# Patient Record
Sex: MALE | Race: WHITE | NOT HISPANIC OR LATINO | Employment: FULL TIME | URBAN - METROPOLITAN AREA
[De-identification: names, ages, dates, MRNs, and addresses within clinical notes are randomized per-mention and may not be internally consistent; named-entity substitution may affect disease eponyms.]

---

## 2021-07-08 RX ORDER — CETIRIZINE HYDROCHLORIDE 10 MG/1
10 TABLET ORAL DAILY PRN
COMMUNITY

## 2021-07-08 RX ORDER — ASPIRIN 81 MG/1
81 TABLET ORAL DAILY
COMMUNITY
End: 2022-04-05 | Stop reason: HOSPADM

## 2021-07-09 ENCOUNTER — ANESTHESIA EVENT (OUTPATIENT)
Dept: GASTROENTEROLOGY | Facility: HOSPITAL | Age: 51
End: 2021-07-09

## 2021-07-09 ENCOUNTER — ANESTHESIA (OUTPATIENT)
Dept: GASTROENTEROLOGY | Facility: HOSPITAL | Age: 51
End: 2021-07-09

## 2021-07-09 ENCOUNTER — HOSPITAL ENCOUNTER (OUTPATIENT)
Facility: HOSPITAL | Age: 51
Setting detail: HOSPITAL OUTPATIENT SURGERY
Discharge: HOME | End: 2021-07-09
Attending: INTERNAL MEDICINE | Admitting: INTERNAL MEDICINE

## 2021-07-09 VITALS
OXYGEN SATURATION: 99 % | TEMPERATURE: 97.2 F | RESPIRATION RATE: 17 BRPM | HEART RATE: 56 BPM | SYSTOLIC BLOOD PRESSURE: 121 MMHG | WEIGHT: 271.17 LBS | DIASTOLIC BLOOD PRESSURE: 62 MMHG | HEIGHT: 73 IN | BODY MASS INDEX: 35.94 KG/M2

## 2021-07-09 PROBLEM — K57.90 DIVERTICULOSIS: Status: ACTIVE | Noted: 2021-07-09

## 2021-07-09 PROBLEM — K64.9 HEMORRHOIDS: Status: ACTIVE | Noted: 2021-07-09

## 2021-07-09 PROCEDURE — 25010000002 PROPOFOL 10 MG/ML EMULSION: Performed by: NURSE ANESTHETIST, CERTIFIED REGISTERED

## 2021-07-09 PROCEDURE — 45378 DIAGNOSTIC COLONOSCOPY: CPT | Performed by: INTERNAL MEDICINE

## 2021-07-09 RX ORDER — LIDOCAINE HYDROCHLORIDE 20 MG/ML
INJECTION, SOLUTION INFILTRATION; PERINEURAL AS NEEDED
Status: DISCONTINUED | OUTPATIENT
Start: 2021-07-09 | End: 2021-07-09 | Stop reason: SURG

## 2021-07-09 RX ORDER — SODIUM CHLORIDE, SODIUM LACTATE, POTASSIUM CHLORIDE, CALCIUM CHLORIDE 600; 310; 30; 20 MG/100ML; MG/100ML; MG/100ML; MG/100ML
30 INJECTION, SOLUTION INTRAVENOUS CONTINUOUS
Status: DISCONTINUED | OUTPATIENT
Start: 2021-07-09 | End: 2021-07-09 | Stop reason: HOSPADM

## 2021-07-09 RX ADMIN — LIDOCAINE HYDROCHLORIDE 40 MG: 20 INJECTION, SOLUTION INFILTRATION; PERINEURAL at 10:58

## 2021-07-09 RX ADMIN — SODIUM CHLORIDE, POTASSIUM CHLORIDE, SODIUM LACTATE AND CALCIUM CHLORIDE 30 ML/HR: 600; 310; 30; 20 INJECTION, SOLUTION INTRAVENOUS at 10:52

## 2021-07-09 RX ADMIN — PROPOFOL 250 MCG/KG/MIN: 10 INJECTION, EMULSION INTRAVENOUS at 10:58

## 2021-07-09 NOTE — H&P
"Pre Procedure History & Physical    Chief Complaint:   Colon cancer screening    Subjective     HPI:   Colon cancer screening average risk    Past Medical History:   Past Medical History:   Diagnosis Date   • DDD (degenerative disc disease), lumbar    • Sleep apnea    • Stomach ulcer    • Stool incontinence    • Urine incontinence        Past Surgical History:  Past Surgical History:   Procedure Laterality Date   • APPENDECTOMY     • CARDIAC CATHETERIZATION     • HAND SURGERY Right        Family History:  Family History   Problem Relation Age of Onset   • Colon cancer Neg Hx    • Malig Hyperthermia Neg Hx        Social History:   reports that he quit smoking about 7 years ago. He quit smokeless tobacco use about 5 years ago.  His smokeless tobacco use included chew. He reports that he does not drink alcohol and does not use drugs.    Medications:   Medications Prior to Admission   Medication Sig Dispense Refill Last Dose   • cetirizine (zyrTEC) 10 MG tablet Take 10 mg by mouth Daily.      • aspirin (aspirin) 81 MG EC tablet Take 81 mg by mouth Daily.          Allergies:  Patient has no known allergies.        Objective     Height 185.4 cm (72.99\"), weight 123 kg (271 lb 2.7 oz).    Physical Exam   Constitutional: Pt is oriented to person, place, and time and well-developed, well-nourished, and in no distress.   Mouth/Throat: Oropharynx is clear and moist.   Neck: Normal range of motion.   Cardiovascular: Normal rate, regular rhythm and normal heart sounds.    Pulmonary/Chest: Effort normal and breath sounds normal.   Abdominal: Soft. Nontender  Skin: Skin is warm and dry.   Psychiatric: Mood, memory, affect and judgment normal.     Assessment/Plan     Diagnosis:  Average of screening    Anticipated Surgical Procedure:  Colonoscopy    The risks, benefits, and alternatives of this procedure have been discussed with the patient or the responsible party- the patient understands and agrees to proceed.            "

## 2021-07-09 NOTE — ANESTHESIA PREPROCEDURE EVALUATION
Anesthesia Evaluation     Patient summary reviewed and Nursing notes reviewed   no history of anesthetic complications:  NPO Solid Status: > 8 hours  NPO Liquid Status: > 2 hours           Airway   Mallampati: II  TM distance: >3 FB  Neck ROM: full  No difficulty expected  Dental      Pulmonary - normal exam    breath sounds clear to auscultation  (+) sleep apnea on CPAP,   Cardiovascular - normal exam  Exercise tolerance: good (4-7 METS)    Rhythm: regular        Neuro/Psych- negative ROS  GI/Hepatic/Renal/Endo    (+) obesity,  PUD,      Musculoskeletal     Abdominal    Substance History - negative use     OB/GYN negative ob/gyn ROS         Other   arthritis,                      Anesthesia Plan    ASA 2     general   total IV anesthesia  intravenous induction     Anesthetic plan, all risks, benefits, and alternatives have been provided, discussed and informed consent has been obtained with: patient.

## 2021-07-09 NOTE — ANESTHESIA POSTPROCEDURE EVALUATION
Patient: Len Giron    Procedure Summary     Date: 07/09/21 Room / Location: [facility] ENDOSCOPY 4 / [facility]    Anesthesia Start: 1058 Anesthesia Stop: 1120    Procedure: COLONOSCOPY (N/A ) Diagnosis: (COLON SCREENING)    Surgeons: Dorian Chung MD Provider: Vic Nguyen MD    Anesthesia Type: general ASA Status: 2          Anesthesia Type: general    Vitals  Vitals Value Taken Time   /62 07/09/21 1142   Temp 36.2 °C (97.2 °F) 07/09/21 1142   Pulse 56 07/09/21 1142   Resp 17 07/09/21 1142   SpO2 99 % 07/09/21 1142           Post Anesthesia Care and Evaluation    Patient location during evaluation: bedside  Patient participation: complete - patient participated  Level of consciousness: awake  Pain score: 0  Pain management: adequate  Airway patency: patent  Anesthetic complications: No anesthetic complications  PONV Status: none  Cardiovascular status: acceptable and stable  Respiratory status: acceptable and room air  Hydration status: acceptable

## 2021-08-06 ENCOUNTER — TELEPHONE (OUTPATIENT)
Dept: GASTROENTEROLOGY | Facility: CLINIC | Age: 51
End: 2021-08-06

## 2022-04-04 ENCOUNTER — APPOINTMENT (OUTPATIENT)
Dept: CT IMAGING | Facility: HOSPITAL | Age: 52
End: 2022-04-04

## 2022-04-04 ENCOUNTER — HOSPITAL ENCOUNTER (OUTPATIENT)
Facility: HOSPITAL | Age: 52
Setting detail: OBSERVATION
Discharge: HOME | End: 2022-04-05
Attending: EMERGENCY MEDICINE | Admitting: INTERNAL MEDICINE

## 2022-04-04 DIAGNOSIS — N28.0 RENAL INFARCTION: ICD-10-CM

## 2022-04-04 DIAGNOSIS — R10.9 ACUTE RIGHT FLANK PAIN: ICD-10-CM

## 2022-04-04 DIAGNOSIS — R10.13 ABDOMINAL PAIN, ACUTE, EPIGASTRIC: Primary | ICD-10-CM

## 2022-04-04 LAB
ALBUMIN SERPL-MCNC: 4.8 G/DL (ref 3.5–5.2)
ALBUMIN/GLOB SERPL: 1.5 G/DL
ALP SERPL-CCNC: 82 U/L (ref 39–117)
ALT SERPL W P-5'-P-CCNC: 32 U/L (ref 1–41)
ANION GAP SERPL CALCULATED.3IONS-SCNC: 11.6 MMOL/L (ref 5–15)
AST SERPL-CCNC: 30 U/L (ref 1–40)
BASOPHILS # BLD AUTO: 0.04 10*3/MM3 (ref 0–0.2)
BASOPHILS NFR BLD AUTO: 0.4 % (ref 0–1.5)
BILIRUB SERPL-MCNC: 0.7 MG/DL (ref 0–1.2)
BILIRUB UR QL STRIP: NEGATIVE
BUN SERPL-MCNC: 15 MG/DL (ref 6–20)
BUN/CREAT SERPL: 13.9 (ref 7–25)
CALCIUM SPEC-SCNC: 9.7 MG/DL (ref 8.6–10.5)
CHLORIDE SERPL-SCNC: 103 MMOL/L (ref 98–107)
CLARITY UR: CLEAR
CO2 SERPL-SCNC: 25.4 MMOL/L (ref 22–29)
COLOR UR: YELLOW
CREAT SERPL-MCNC: 1.08 MG/DL (ref 0.76–1.27)
DEPRECATED RDW RBC AUTO: 39.1 FL (ref 37–54)
EGFRCR SERPLBLD CKD-EPI 2021: 82.6 ML/MIN/1.73
EOSINOPHIL # BLD AUTO: 0.24 10*3/MM3 (ref 0–0.4)
EOSINOPHIL NFR BLD AUTO: 2.7 % (ref 0.3–6.2)
ERYTHROCYTE [DISTWIDTH] IN BLOOD BY AUTOMATED COUNT: 12.8 % (ref 12.3–15.4)
GLOBULIN UR ELPH-MCNC: 3.1 GM/DL
GLUCOSE SERPL-MCNC: 126 MG/DL (ref 65–99)
GLUCOSE UR STRIP-MCNC: NEGATIVE MG/DL
HCT VFR BLD AUTO: 45.7 % (ref 37.5–51)
HGB BLD-MCNC: 15.6 G/DL (ref 13–17.7)
HGB UR QL STRIP.AUTO: NEGATIVE
HOLD SPECIMEN: NORMAL
HOLD SPECIMEN: NORMAL
IMM GRANULOCYTES # BLD AUTO: 0.03 10*3/MM3 (ref 0–0.05)
IMM GRANULOCYTES NFR BLD AUTO: 0.3 % (ref 0–0.5)
INR PPP: 1.09 (ref 2–3)
KETONES UR QL STRIP: ABNORMAL
LEUKOCYTE ESTERASE UR QL STRIP.AUTO: NEGATIVE
LIPASE SERPL-CCNC: 21 U/L (ref 13–60)
LYMPHOCYTES # BLD AUTO: 2 10*3/MM3 (ref 0.7–3.1)
LYMPHOCYTES NFR BLD AUTO: 22.1 % (ref 19.6–45.3)
MCH RBC QN AUTO: 28.8 PG (ref 26.6–33)
MCHC RBC AUTO-ENTMCNC: 34.1 G/DL (ref 31.5–35.7)
MCV RBC AUTO: 84.3 FL (ref 79–97)
MONOCYTES # BLD AUTO: 0.91 10*3/MM3 (ref 0.1–0.9)
MONOCYTES NFR BLD AUTO: 10.1 % (ref 5–12)
NEUTROPHILS NFR BLD AUTO: 5.82 10*3/MM3 (ref 1.7–7)
NEUTROPHILS NFR BLD AUTO: 64.4 % (ref 42.7–76)
NITRITE UR QL STRIP: NEGATIVE
NRBC BLD AUTO-RTO: 0 /100 WBC (ref 0–0.2)
PH UR STRIP.AUTO: 5.5 [PH] (ref 5–8)
PLATELET # BLD AUTO: 260 10*3/MM3 (ref 140–450)
PMV BLD AUTO: 10.2 FL (ref 6–12)
POTASSIUM SERPL-SCNC: 4.1 MMOL/L (ref 3.5–5.2)
PROT SERPL-MCNC: 7.9 G/DL (ref 6–8.5)
PROT UR QL STRIP: NEGATIVE
PROTHROMBIN TIME: 11.2 SECONDS (ref 9.4–12)
RBC # BLD AUTO: 5.42 10*6/MM3 (ref 4.14–5.8)
SODIUM SERPL-SCNC: 140 MMOL/L (ref 136–145)
SP GR UR STRIP: 1.02 (ref 1–1.03)
UROBILINOGEN UR QL STRIP: ABNORMAL
WBC NRBC COR # BLD: 9.04 10*3/MM3 (ref 3.4–10.8)
WHOLE BLOOD HOLD SPECIMEN: NORMAL
WHOLE BLOOD HOLD SPECIMEN: NORMAL

## 2022-04-04 PROCEDURE — 80053 COMPREHEN METABOLIC PANEL: CPT | Performed by: EMERGENCY MEDICINE

## 2022-04-04 PROCEDURE — 36415 COLL VENOUS BLD VENIPUNCTURE: CPT | Performed by: EMERGENCY MEDICINE

## 2022-04-04 PROCEDURE — 81003 URINALYSIS AUTO W/O SCOPE: CPT | Performed by: EMERGENCY MEDICINE

## 2022-04-04 PROCEDURE — 96375 TX/PRO/DX INJ NEW DRUG ADDON: CPT

## 2022-04-04 PROCEDURE — 96376 TX/PRO/DX INJ SAME DRUG ADON: CPT

## 2022-04-04 PROCEDURE — G0378 HOSPITAL OBSERVATION PER HR: HCPCS

## 2022-04-04 PROCEDURE — 85025 COMPLETE CBC W/AUTO DIFF WBC: CPT | Performed by: EMERGENCY MEDICINE

## 2022-04-04 PROCEDURE — 74177 CT ABD & PELVIS W/CONTRAST: CPT

## 2022-04-04 PROCEDURE — 25010000002 HYDROMORPHONE 1 MG/ML SOLUTION: Performed by: EMERGENCY MEDICINE

## 2022-04-04 PROCEDURE — 0 IOPAMIDOL PER 1 ML: Performed by: EMERGENCY MEDICINE

## 2022-04-04 PROCEDURE — 96374 THER/PROPH/DIAG INJ IV PUSH: CPT

## 2022-04-04 PROCEDURE — 83690 ASSAY OF LIPASE: CPT | Performed by: EMERGENCY MEDICINE

## 2022-04-04 PROCEDURE — 99284 EMERGENCY DEPT VISIT MOD MDM: CPT

## 2022-04-04 PROCEDURE — 99220 PR INITIAL OBSERVATION CARE/DAY 70 MINUTES: CPT | Performed by: INTERNAL MEDICINE

## 2022-04-04 PROCEDURE — 25010000002 ONDANSETRON PER 1 MG: Performed by: EMERGENCY MEDICINE

## 2022-04-04 PROCEDURE — 85610 PROTHROMBIN TIME: CPT | Performed by: INTERNAL MEDICINE

## 2022-04-04 RX ORDER — SODIUM CHLORIDE 0.9 % (FLUSH) 0.9 %
10 SYRINGE (ML) INJECTION AS NEEDED
Status: DISCONTINUED | OUTPATIENT
Start: 2022-04-04 | End: 2022-04-05 | Stop reason: HOSPADM

## 2022-04-04 RX ORDER — ONDANSETRON 2 MG/ML
4 INJECTION INTRAMUSCULAR; INTRAVENOUS ONCE
Status: COMPLETED | OUTPATIENT
Start: 2022-04-04 | End: 2022-04-04

## 2022-04-04 RX ORDER — ACETAMINOPHEN 325 MG/1
650 TABLET ORAL EVERY 4 HOURS PRN
Status: DISCONTINUED | OUTPATIENT
Start: 2022-04-04 | End: 2022-04-05 | Stop reason: HOSPADM

## 2022-04-04 RX ORDER — HYDROCODONE BITARTRATE AND ACETAMINOPHEN 5; 325 MG/1; MG/1
1 TABLET ORAL EVERY 6 HOURS PRN
Status: DISCONTINUED | OUTPATIENT
Start: 2022-04-04 | End: 2022-04-05 | Stop reason: HOSPADM

## 2022-04-04 RX ORDER — ONDANSETRON 2 MG/ML
4 INJECTION INTRAMUSCULAR; INTRAVENOUS EVERY 6 HOURS PRN
Status: DISCONTINUED | OUTPATIENT
Start: 2022-04-04 | End: 2022-04-05 | Stop reason: HOSPADM

## 2022-04-04 RX ORDER — SODIUM CHLORIDE, SODIUM LACTATE, POTASSIUM CHLORIDE, CALCIUM CHLORIDE 600; 310; 30; 20 MG/100ML; MG/100ML; MG/100ML; MG/100ML
100 INJECTION, SOLUTION INTRAVENOUS CONTINUOUS
Status: DISCONTINUED | OUTPATIENT
Start: 2022-04-04 | End: 2022-04-05

## 2022-04-04 RX ORDER — HYDROCODONE BITARTRATE AND ACETAMINOPHEN 10; 325 MG/1; MG/1
1 TABLET ORAL EVERY 6 HOURS PRN
Status: DISCONTINUED | OUTPATIENT
Start: 2022-04-04 | End: 2022-04-05 | Stop reason: HOSPADM

## 2022-04-04 RX ADMIN — SODIUM CHLORIDE 1000 ML: 9 INJECTION, SOLUTION INTRAVENOUS at 17:51

## 2022-04-04 RX ADMIN — APIXABAN 10 MG: 5 TABLET, FILM COATED ORAL at 21:57

## 2022-04-04 RX ADMIN — IOPAMIDOL 100 ML: 755 INJECTION, SOLUTION INTRAVENOUS at 18:16

## 2022-04-04 RX ADMIN — ONDANSETRON 4 MG: 2 INJECTION INTRAMUSCULAR; INTRAVENOUS at 17:51

## 2022-04-04 RX ADMIN — HYDROMORPHONE HYDROCHLORIDE 1 MG: 1 INJECTION, SOLUTION INTRAMUSCULAR; INTRAVENOUS; SUBCUTANEOUS at 17:51

## 2022-04-04 RX ADMIN — HYDROMORPHONE HYDROCHLORIDE 1 MG: 1 INJECTION, SOLUTION INTRAMUSCULAR; INTRAVENOUS; SUBCUTANEOUS at 20:13

## 2022-04-04 NOTE — ED PROVIDER NOTES
Time: 4:34 PM EDT  Arrived by: HAILEE  Chief Complaint: Abdominal pain  History provided by: Patient  History is limited by: N/A     History of Present Illness:  Patient is a 51 y.o. male that presents to the emergency department with abdominal pain over the past 3 days. The pain is located mainly in the upper abdomen, radiates into his lower back, and is described as sharp and severe. He states that the pain worsens whenever he takes deep breaths..    Endorses one episode of diarrhea without any N/V. No urinary complaints. No black/bloody stool. Reports hx of appendectomy and stomach ulcer. No hx of cholecystectomy or pancreatitis.     No fevers or chills, but states that he did feel hot and diaphoretic. Endorses generalized body aches and mild cough.     Denies any medicinal allergies.       History provided by:  Patient   used: No        Similar Symptoms Previously: Hx stomach ulcers, appendectomy  Recently seen: N/a      Patient Care Team  Primary Care Provider: Provider, No Known    Past Medical History:     No Known Allergies  Past Medical History:   Diagnosis Date   • DDD (degenerative disc disease), lumbar    • Sleep apnea    • Stomach ulcer    • Stool incontinence    • Urine incontinence      Past Surgical History:   Procedure Laterality Date   • APPENDECTOMY     • CARDIAC CATHETERIZATION     • COLONOSCOPY N/A 7/9/2021    Procedure: COLONOSCOPY;  Surgeon: Dorian Chung MD;  Location: [facility] ENDOSCOPY;  Service: Gastroenterology;  Laterality: N/A;  diverticulosis/HEMORRHOIDS   • HAND SURGERY Right      Family History   Problem Relation Age of Onset   • Colon cancer Neg Hx    • Malig Hyperthermia Neg Hx        Home Medications:  Prior to Admission medications    Medication Sig Start Date End Date Taking? Authorizing Provider   aspirin (aspirin) 81 MG EC tablet Take 81 mg by mouth Daily.    Provider, MD Caleb   cetirizine (zyrTEC) 10 MG tablet Take 10 mg by mouth Daily.     "Provider, Caleb, MD        Social History:   Social History     Tobacco Use   • Smoking status: Former Smoker     Quit date: 2014     Years since quittin.7   • Smokeless tobacco: Former User     Types: Chew     Quit date: 2016   Substance Use Topics   • Alcohol use: Never   • Drug use: Never     Recent travel: no     Review of Systems:  Review of Systems   Constitutional: Positive for diaphoresis. Negative for chills and fever.        Generalized body aches   HENT: Negative for congestion, nosebleeds and rhinorrhea.    Eyes: Negative for visual disturbance.   Respiratory: Positive for cough (Mild). Negative for shortness of breath.    Cardiovascular: Negative for chest pain and leg swelling.   Gastrointestinal: Positive for abdominal pain and diarrhea. Negative for blood in stool, nausea and vomiting.   Genitourinary: Negative for dysuria and hematuria.   Musculoskeletal: Positive for back pain.   Skin: Negative for pallor and rash.   Neurological: Negative for weakness, numbness and headaches.   All other systems reviewed and are negative.       Physical Exam:  /86 (BP Location: Left arm, Patient Position: Sitting)   Pulse 72   Temp 98.5 °F (36.9 °C) (Oral)   Resp 18   Ht 185.4 cm (73\")   Wt 124 kg (273 lb 9.5 oz)   SpO2 97%   BMI 36.10 kg/m²     Physical Exam  Vitals and nursing note reviewed.   Constitutional:       Appearance: Normal appearance.      Comments: Moderately uncomfortable secondary to abdominal pain   HENT:      Head: Normocephalic and atraumatic.      Mouth/Throat:      Mouth: Mucous membranes are moist.   Eyes:      General: No scleral icterus.     Extraocular Movements: Extraocular movements intact.      Pupils: Pupils are equal, round, and reactive to light.   Cardiovascular:      Rate and Rhythm: Normal rate and regular rhythm.      Pulses: Normal pulses.      Heart sounds: Normal heart sounds. No murmur heard.  Pulmonary:      Effort: Pulmonary effort is normal. " No respiratory distress.      Breath sounds: Normal breath sounds. No wheezing.   Abdominal:      General: There is no distension.      Palpations: Abdomen is soft.      Tenderness: There is generalized abdominal tenderness (Severe). There is guarding. There is no rebound.   Musculoskeletal:         General: Normal range of motion.      Cervical back: Neck supple.      Right lower leg: No edema.      Left lower leg: No edema.   Skin:     Coloration: Skin is not pale.   Neurological:      General: No focal deficit present.      Mental Status: He is alert and oriented to person, place, and time.      Cranial Nerves: No cranial nerve deficit.      Sensory: No sensory deficit.        Mod uncomfy second to pain, severely diffuse tendy abd gaurding          Medications in the Emergency Department:  Medications   sodium chloride 0.9 % flush 10 mL (has no administration in time range)   sodium chloride 0.9 % flush 10 mL (has no administration in time range)   HYDROmorphone (DILAUDID) injection 0.5 mg (has no administration in time range)   HYDROcodone-acetaminophen (NORCO) 5-325 MG per tablet 1 tablet (has no administration in time range)   HYDROcodone-acetaminophen (NORCO)  MG per tablet 1 tablet (has no administration in time range)   ondansetron (ZOFRAN) injection 4 mg (has no administration in time range)   apixaban (ELIQUIS) tablet 10 mg (has no administration in time range)   lactated ringers infusion (has no administration in time range)   sodium chloride 0.9 % bolus 1,000 mL (0 mL Intravenous Stopped 4/4/22 1947)   HYDROmorphone (DILAUDID) injection 1 mg (1 mg Intravenous Given 4/4/22 1751)   ondansetron (ZOFRAN) injection 4 mg (4 mg Intravenous Given 4/4/22 1751)   iopamidol (ISOVUE-370) 76 % injection 100 mL (100 mL Intravenous Given 4/4/22 1816)   HYDROmorphone (DILAUDID) injection 1 mg (1 mg Intravenous Given 4/4/22 2013)   apixaban (ELIQUIS) tablet 10 mg (10 mg Oral Given 4/4/22 2157)        Labs  Lab  Results (last 24 hours)     Procedure Component Value Units Date/Time    CBC & Differential [[id number]]  (Abnormal) Collected: 04/04/22 1617    Specimen: Blood Updated: 04/04/22 1652    Narrative:      The following orders were created for panel order CBC & Differential.  Procedure                               Abnormality         Status                     ---------                               -----------         ------                     CBC Auto Differential[[id number]]        Abnormal            Final result                 Please view results for these tests on the individual orders.    Comprehensive Metabolic Panel [[id number]]  (Abnormal) Collected: 04/04/22 1617    Specimen: Blood Updated: 04/04/22 1720     Glucose 126 mg/dL      BUN 15 mg/dL      Creatinine 1.08 mg/dL      Sodium 140 mmol/L      Potassium 4.1 mmol/L      Chloride 103 mmol/L      CO2 25.4 mmol/L      Calcium 9.7 mg/dL      Total Protein 7.9 g/dL      Albumin 4.80 g/dL      ALT (SGPT) 32 U/L      AST (SGOT) 30 U/L      Alkaline Phosphatase 82 U/L      Total Bilirubin 0.7 mg/dL      Globulin 3.1 gm/dL      A/G Ratio 1.5 g/dL      BUN/Creatinine Ratio 13.9     Anion Gap 11.6 mmol/L      eGFR 82.6 mL/min/1.73      Comment: National Kidney Foundation and American Society of Nephrology (ASN) Task Force recommended calculation based on the Chronic Kidney Disease Epidemiology Collaboration (CKD-EPI) equation refit without adjustment for race.       Narrative:      GFR Normal >60  Chronic Kidney Disease <60  Kidney Failure <15      Lipase [[id number]]  (Normal) Collected: 04/04/22 1617    Specimen: Blood Updated: 04/04/22 1720     Lipase 21 U/L     CBC Auto Differential [[id number]]  (Abnormal) Collected: 04/04/22 1617    Specimen: Blood Updated: 04/04/22 1652     WBC 9.04 10*3/mm3      RBC 5.42 10*6/mm3      Hemoglobin 15.6 g/dL      Hematocrit 45.7 %      MCV 84.3 fL      MCH 28.8 pg      MCHC 34.1 g/dL      RDW 12.8 %      RDW-SD 39.1 fl      MPV  10.2 fL      Platelets 260 10*3/mm3      Neutrophil % 64.4 %      Lymphocyte % 22.1 %      Monocyte % 10.1 %      Eosinophil % 2.7 %      Basophil % 0.4 %      Immature Grans % 0.3 %      Neutrophils, Absolute 5.82 10*3/mm3      Lymphocytes, Absolute 2.00 10*3/mm3      Monocytes, Absolute 0.91 10*3/mm3      Eosinophils, Absolute 0.24 10*3/mm3      Basophils, Absolute 0.04 10*3/mm3      Immature Grans, Absolute 0.03 10*3/mm3      nRBC 0.0 /100 WBC     Urinalysis With Microscopic If Indicated (No Culture) - Urine, Clean Catch [[id number]]  (Abnormal) Collected: 04/04/22 1742    Specimen: Urine, Clean Catch Updated: 04/04/22 1800     Color, UA Yellow     Appearance, UA Clear     pH, UA 5.5     Specific Gravity, UA 1.023     Glucose, UA Negative     Ketones, UA Trace     Bilirubin, UA Negative     Blood, UA Negative     Protein, UA Negative     Leuk Esterase, UA Negative     Nitrite, UA Negative     Urobilinogen, UA 0.2 E.U./dL    Narrative:      Urine microscopic not indicated.           Imaging:  CT Abdomen Pelvis With Contrast    Result Date: 4/4/2022  PROCEDURE: CT ABDOMEN PELVIS W CONTRAST  COMPARISON: None  INDICATIONS: Epigastric pain, eval perfed peptic ulcer vs gallstones  TECHNIQUE: After obtaining the patient's consent, CT images were created with non-ionic intravenous contrast material.   PROTOCOL:   Standard imaging protocol performed    RADIATION:   DLP: 1756.7mGy*cm   Automated exposure control was utilized to minimize radiation dose. CONTRAST: 93cc Isovue 370 I.V. LABS:   eGFR: >60ml/min/1.73m2  FINDINGS:  There is dependent atelectasis in the lung bases bilaterally.  The liver, gallbladder, spleen, pancreas and adrenal glands appear unremarkable.  In the superior pole of the right kidney is a low-density focus measuring 3.0 cm x 2.4 cm.  This measures 20 Hounsfield units.  Perinephric stranding is noted adjacent to the lesion.  The left kidney appears unremarkable.  No adenopathy or free fluid is seen in  the abdomen or pelvis.  Mild colonic diverticulosis is noted.  An appendectomy has been performed.  No acute bowel abnormality is seen.  The lack of oral contrast limits evaluation of the bowel.  No focal osseous lesion is seen.        1. 3.0 cm x 2.4 cm low density focus in the superior right kidney may represent an underlying cyst.  The presence of adjacent infiltrative change raises the possibility of superimposed infection or possibly a small renal infarct. 2. Previous appendectomy     Joey Huizar M.D.       Electronically Signed and Approved By: Joey Huizar M.D. on 4/04/2022 at 18:51               Procedures:  Procedures    Progress                            Medical Decision Making:  MDM     My differential diagnosis in this patient with flank pain includes but not limited to: Renal colic from obstructing stone, pyelonephritis, musculoskeletal back pain, atypical presentation of diverticulitis or colitis.    In my differential diagnosis of this patient with abdominal pain, I considered viral gastroenteritis, acute gastritis, GERD exacerbation with esophagitis, peptic ulcer disease, pancreatitis, cholecystitis, appendicitis.          This patient is a 51-year-old male presenting with acute diffuse upper abdominal pain and also flank pain for the past few days, getting worse.    His vital signs are within normal limits and no fever noted.    All of his blood work including white blood cell count is normal and normal renal function and normal LFTs and lipase.    Urinalysis negative for blood or infection.    Given his severe pain I give him some IV Dilaudid and Zofran for nausea and some IV fluids here.    I did send him for a CT scan of the abdomen pelvis with IV contrast that came back showing unremarkable intra-abdominal organs but a 3 cm area of hypodensity in the superior right kidney with some surrounding infiltrative change or stranding.    The differential includes likely renal infarct versus possible  infection, but he has no urinary symptoms and completely negative urinalysis and normal white count and no fever or sign of infection here.    I considered most likely this is a renal infarct causing his flank and abdominal pain and have given him a dose of Eliquis after consulting with our vascular surgeon on, who recommends just anticoagulation and further work-up for other thromboembolism or hypercoagulable state, but denies any need for emergent revascularization at this time.    I have also discussed in consult with nephrology on-call who recommends similar including looking for underlying atrial fibrillation and further work-up for hypercoagulable state and anticoagulant initiation.        Because [facility] I will run this admission by them to see if they prefer him to be transferred to [facility] at this time.      The [facility] physician, Dr. Jj Jamil, has informed me that they are on diver due to lack of available beds, and recommends I go ahead and admit him to our facility here, and he will make a note in the patient's chart for [facility] insurance to cover his hospital bills here.      Otherwise I will plan to admit to our hospital for his intractable abdominal and flank pain in the setting of likely right renal infarct and for further investigation with CTA of chest and abdomen and pelvis as per vascular surgery recommendations.    Final diagnoses:   Abdominal pain, acute, epigastric   Renal infarction (HCC)   Acute right flank pain        Disposition:  ED Disposition     ED Disposition   Decision to Admit    Condition   --    Comment   Level of Care: Telemetry [5]   Diagnosis: Abdominal pain, acute, epigastric [462372]   Admitting Physician: KUMAR AMARO [[id number]]   Attending Physician: KUMAR AMARO [[id number]]               Documentation assistance provided by Sid Zimmerman acting as scribe for Archie Porter MD  . Information recorded by the scribe  was done at my direction and has been verified and validated by me.        Sid Zimmerman  04/04/22 1723       Archie Porter MD  04/04/22 1920

## 2022-04-05 VITALS
HEIGHT: 73 IN | OXYGEN SATURATION: 95 % | TEMPERATURE: 98.4 F | WEIGHT: 272.49 LBS | HEART RATE: 94 BPM | BODY MASS INDEX: 36.11 KG/M2 | RESPIRATION RATE: 18 BRPM | DIASTOLIC BLOOD PRESSURE: 94 MMHG | SYSTOLIC BLOOD PRESSURE: 140 MMHG

## 2022-04-05 LAB
ANION GAP SERPL CALCULATED.3IONS-SCNC: 10.8 MMOL/L (ref 5–15)
BASOPHILS # BLD AUTO: 0.05 10*3/MM3 (ref 0–0.2)
BASOPHILS NFR BLD AUTO: 0.5 % (ref 0–1.5)
BUN SERPL-MCNC: 13 MG/DL (ref 6–20)
BUN/CREAT SERPL: 13 (ref 7–25)
CALCIUM SPEC-SCNC: 9 MG/DL (ref 8.6–10.5)
CHLORIDE SERPL-SCNC: 103 MMOL/L (ref 98–107)
CO2 SERPL-SCNC: 22.2 MMOL/L (ref 22–29)
CREAT SERPL-MCNC: 1 MG/DL (ref 0.76–1.27)
DEPRECATED RDW RBC AUTO: 39.8 FL (ref 37–54)
EGFRCR SERPLBLD CKD-EPI 2021: 90.6 ML/MIN/1.73
EOSINOPHIL # BLD AUTO: 0.22 10*3/MM3 (ref 0–0.4)
EOSINOPHIL NFR BLD AUTO: 2.1 % (ref 0.3–6.2)
ERYTHROCYTE [DISTWIDTH] IN BLOOD BY AUTOMATED COUNT: 12.9 % (ref 12.3–15.4)
GLUCOSE SERPL-MCNC: 96 MG/DL (ref 65–99)
HCT VFR BLD AUTO: 43.7 % (ref 37.5–51)
HGB BLD-MCNC: 14.6 G/DL (ref 13–17.7)
IMM GRANULOCYTES # BLD AUTO: 0.02 10*3/MM3 (ref 0–0.05)
IMM GRANULOCYTES NFR BLD AUTO: 0.2 % (ref 0–0.5)
LYMPHOCYTES # BLD AUTO: 2.33 10*3/MM3 (ref 0.7–3.1)
LYMPHOCYTES NFR BLD AUTO: 22.1 % (ref 19.6–45.3)
MCH RBC QN AUTO: 28.2 PG (ref 26.6–33)
MCHC RBC AUTO-ENTMCNC: 33.4 G/DL (ref 31.5–35.7)
MCV RBC AUTO: 84.5 FL (ref 79–97)
MONOCYTES # BLD AUTO: 1.69 10*3/MM3 (ref 0.1–0.9)
MONOCYTES NFR BLD AUTO: 16.1 % (ref 5–12)
NEUTROPHILS NFR BLD AUTO: 59 % (ref 42.7–76)
NEUTROPHILS NFR BLD AUTO: 6.21 10*3/MM3 (ref 1.7–7)
NRBC BLD AUTO-RTO: 0 /100 WBC (ref 0–0.2)
PLATELET # BLD AUTO: 238 10*3/MM3 (ref 140–450)
PMV BLD AUTO: 10.2 FL (ref 6–12)
POTASSIUM SERPL-SCNC: 4 MMOL/L (ref 3.5–5.2)
RBC # BLD AUTO: 5.17 10*6/MM3 (ref 4.14–5.8)
SODIUM SERPL-SCNC: 136 MMOL/L (ref 136–145)
WBC NRBC COR # BLD: 10.52 10*3/MM3 (ref 3.4–10.8)

## 2022-04-05 PROCEDURE — 81241 F5 GENE: CPT | Performed by: INTERNAL MEDICINE

## 2022-04-05 PROCEDURE — 94799 UNLISTED PULMONARY SVC/PX: CPT

## 2022-04-05 PROCEDURE — 83520 IMMUNOASSAY QUANT NOS NONAB: CPT | Performed by: INTERNAL MEDICINE

## 2022-04-05 PROCEDURE — 85025 COMPLETE CBC W/AUTO DIFF WBC: CPT | Performed by: INTERNAL MEDICINE

## 2022-04-05 PROCEDURE — 94660 CPAP INITIATION&MGMT: CPT

## 2022-04-05 PROCEDURE — 96376 TX/PRO/DX INJ SAME DRUG ADON: CPT

## 2022-04-05 PROCEDURE — 80048 BASIC METABOLIC PNL TOTAL CA: CPT | Performed by: INTERNAL MEDICINE

## 2022-04-05 PROCEDURE — 25010000002 HYDROMORPHONE 1 MG/ML SOLUTION: Performed by: INTERNAL MEDICINE

## 2022-04-05 PROCEDURE — 96361 HYDRATE IV INFUSION ADD-ON: CPT

## 2022-04-05 PROCEDURE — G0378 HOSPITAL OBSERVATION PER HR: HCPCS

## 2022-04-05 PROCEDURE — 99217 PR OBSERVATION CARE DISCHARGE MANAGEMENT: CPT | Performed by: INTERNAL MEDICINE

## 2022-04-05 PROCEDURE — 94761 N-INVAS EAR/PLS OXIMETRY MLT: CPT

## 2022-04-05 RX ORDER — PERPHENAZINE/AMITRIPTYLINE HCL 4 MG-50 MG
1 TABLET ORAL DAILY
COMMUNITY
End: 2023-04-04

## 2022-04-05 RX ORDER — HYDROCODONE BITARTRATE AND ACETAMINOPHEN 10; 325 MG/1; MG/1
1 TABLET ORAL EVERY 6 HOURS PRN
Qty: 15 TABLET | Refills: 0 | Status: SHIPPED | OUTPATIENT
Start: 2022-04-05 | End: 2022-04-05 | Stop reason: SDUPTHER

## 2022-04-05 RX ORDER — PRASTERONE (DHEA) 50 MG
1 CAPSULE ORAL DAILY
COMMUNITY
End: 2023-04-04

## 2022-04-05 RX ORDER — AMPICILLIN TRIHYDRATE 250 MG
500 CAPSULE ORAL DAILY
COMMUNITY
End: 2023-04-04

## 2022-04-05 RX ORDER — HYDROCODONE BITARTRATE AND ACETAMINOPHEN 10; 325 MG/1; MG/1
1 TABLET ORAL EVERY 6 HOURS PRN
Qty: 15 TABLET | Refills: 0 | Status: SHIPPED | OUTPATIENT
Start: 2022-04-05 | End: 2022-04-11

## 2022-04-05 RX ORDER — CHLORAL HYDRATE 500 MG
1000 CAPSULE ORAL
COMMUNITY
End: 2023-04-04

## 2022-04-05 RX ORDER — VIT C/B6/B5/MAGNESIUM/HERB 173 50-5-6-5MG
500 CAPSULE ORAL DAILY
COMMUNITY
End: 2023-04-04

## 2022-04-05 RX ADMIN — APIXABAN 10 MG: 5 TABLET, FILM COATED ORAL at 08:41

## 2022-04-05 RX ADMIN — SODIUM CHLORIDE, POTASSIUM CHLORIDE, SODIUM LACTATE AND CALCIUM CHLORIDE 100 ML/HR: 600; 310; 30; 20 INJECTION, SOLUTION INTRAVENOUS at 00:44

## 2022-04-05 RX ADMIN — HYDROCODONE BITARTRATE AND ACETAMINOPHEN 1 TABLET: 10; 325 TABLET ORAL at 00:09

## 2022-04-05 RX ADMIN — HYDROCODONE BITARTRATE AND ACETAMINOPHEN 1 TABLET: 10; 325 TABLET ORAL at 07:33

## 2022-04-05 RX ADMIN — HYDROMORPHONE HYDROCHLORIDE 0.5 MG: 1 INJECTION, SOLUTION INTRAMUSCULAR; INTRAVENOUS; SUBCUTANEOUS at 04:56

## 2022-04-05 NOTE — CONSULTS
Patient Care Team:  Provider, No Known as PCP - General    Chief complaint: Renal Infarct    Subjective     History of Present Illness  50yo presented to ER with worsened abdominal and right flank pain over last 1 week.  CT abd in ER showed low density focus with possible small renal infarct vs. Infection.  He has had normal renal function to this point with creatinine at 1.0.    Review of Systems   Constitutional: Negative for chills, diaphoresis and fever.   Respiratory: Negative for cough, chest tightness and shortness of breath.    Cardiovascular: Negative for chest pain and leg swelling.   Gastrointestinal: Positive for abdominal pain. Negative for abdominal distention, constipation, diarrhea and nausea.   Genitourinary: Positive for flank pain. Negative for dysuria.   Musculoskeletal: Positive for back pain. Negative for joint swelling.   Skin: Negative for rash.        Past Medical History:   Diagnosis Date   • DDD (degenerative disc disease), lumbar    • Sleep apnea    • Stomach ulcer    • Stool incontinence    • Urine incontinence    ,   Past Surgical History:   Procedure Laterality Date   • APPENDECTOMY     • CARDIAC CATHETERIZATION     • COLONOSCOPY N/A 2021    Procedure: COLONOSCOPY;  Surgeon: Dorian Chung MD;  Location: [facility] ENDOSCOPY;  Service: Gastroenterology;  Laterality: N/A;  diverticulosis/HEMORRHOIDS   • HAND SURGERY Right    ,   Family History   Problem Relation Age of Onset   • Colon cancer Neg Hx    • Malig Hyperthermia Neg Hx    ,   Social History     Socioeconomic History   • Marital status:    Tobacco Use   • Smoking status: Former Smoker     Quit date: 2014     Years since quittin.7   • Smokeless tobacco: Former User     Types: Chew     Quit date: 2016   Substance and Sexual Activity   • Alcohol use: Never   • Drug use: Never     E-cigarette/Vaping     E-cigarette/Vaping Substances     E-cigarette/Vaping Devices       ,   Medications Prior to Admission    Medication Sig Dispense Refill Last Dose   • aspirin 81 MG EC tablet Take 81 mg by mouth Daily.   Past Week at Unknown time   • Barberry-Oreg Grape-Goldenseal (BERBERINE COMPLEX PO) Take 1 capsule by mouth Daily.   4/4/2022 at Unknown time   • cetirizine (zyrTEC) 10 MG tablet Take 10 mg by mouth Daily As Needed for Allergies or Rhinitis.   Past Week at Unknown time   • Cinnamon 500 MG capsule Take 500 mg by mouth Daily.   4/4/2022 at Unknown time   • Ginger 500 MG capsule Take 1 capsule by mouth Daily.   4/4/2022 at Unknown time   • Moringa 500 MG capsule Take 1 capsule by mouth Daily.   4/4/2022 at Unknown time   • Omega-3 Fatty Acids (fish oil) 1000 MG capsule capsule Take 1,000 mg by mouth Daily With Breakfast.   4/4/2022 at Unknown time   • Turmeric 500 MG capsule Take 500 mg by mouth Daily.   4/4/2022 at Unknown time   , Scheduled Meds:  apixaban, 10 mg, Oral, Q12H    , Continuous Infusions:  lactated ringers, 100 mL/hr, Last Rate: 100 mL/hr (04/05/22 0044)    , PRN Meds:  •  acetaminophen  •  HYDROcodone-acetaminophen  •  HYDROcodone-acetaminophen  •  HYDROmorphone  •  ondansetron  •  sodium chloride  •  [COMPLETED] Insert peripheral IV **AND** sodium chloride  •  sodium chloride and Allergies:  Patient has no known allergies.    Objective     Vital Signs  Temp:  [97.9 °F (36.6 °C)-99.3 °F (37.4 °C)] 99.3 °F (37.4 °C)  Heart Rate:  [65-85] 85  Resp:  [18-20] 18  BP: (133-141)/(86-89) 133/89    No intake/output data recorded.  I/O last 3 completed shifts:  In: 1000 [IV Piggyback:1000]  Out: -     Physical Exam  Eyes:      General: No scleral icterus.     Pupils: Pupils are equal, round, and reactive to light.   Cardiovascular:      Rate and Rhythm: Normal rate and regular rhythm.      Pulses: Normal pulses.      Heart sounds: Normal heart sounds.   Pulmonary:      Effort: Pulmonary effort is normal.      Breath sounds: Normal breath sounds.   Abdominal:      General: Abdomen is flat.   Musculoskeletal:          General: Normal range of motion.      Cervical back: Normal range of motion.   Skin:     General: Skin is warm and dry.   Neurological:      General: No focal deficit present.      Mental Status: He is alert.   Psychiatric:         Mood and Affect: Mood normal.         Results Review:    I reviewed the patient's new clinical results.    WBC WBC   Date Value Ref Range Status   04/05/2022 10.52 3.40 - 10.80 10*3/mm3 Final   04/04/2022 9.04 3.40 - 10.80 10*3/mm3 Final      HGB Hemoglobin   Date Value Ref Range Status   04/05/2022 14.6 13.0 - 17.7 g/dL Final   04/04/2022 15.6 13.0 - 17.7 g/dL Final      HCT Hematocrit   Date Value Ref Range Status   04/05/2022 43.7 37.5 - 51.0 % Final   04/04/2022 45.7 37.5 - 51.0 % Final      Platlets No results found for: LABPLAT   MCV MCV   Date Value Ref Range Status   04/05/2022 84.5 79.0 - 97.0 fL Final   04/04/2022 84.3 79.0 - 97.0 fL Final          Sodium Sodium   Date Value Ref Range Status   04/05/2022 136 136 - 145 mmol/L Final   04/04/2022 140 136 - 145 mmol/L Final      Potassium Potassium   Date Value Ref Range Status   04/05/2022 4.0 3.5 - 5.2 mmol/L Final   04/04/2022 4.1 3.5 - 5.2 mmol/L Final      Chloride Chloride   Date Value Ref Range Status   04/05/2022 103 98 - 107 mmol/L Final   04/04/2022 103 98 - 107 mmol/L Final      CO2 CO2   Date Value Ref Range Status   04/05/2022 22.2 22.0 - 29.0 mmol/L Final   04/04/2022 25.4 22.0 - 29.0 mmol/L Final      BUN BUN   Date Value Ref Range Status   04/05/2022 13 6 - 20 mg/dL Final   04/04/2022 15 6 - 20 mg/dL Final      Creatinine Creatinine   Date Value Ref Range Status   04/05/2022 1.00 0.76 - 1.27 mg/dL Final   04/04/2022 1.08 0.76 - 1.27 mg/dL Final      Calcium Calcium   Date Value Ref Range Status   04/05/2022 9.0 8.6 - 10.5 mg/dL Final   04/04/2022 9.7 8.6 - 10.5 mg/dL Final      PO4 No results found for: CAPO4   Albumin Albumin   Date Value Ref Range Status   04/04/2022 4.80 3.50 - 5.20 g/dL Final      Magnesium No  results found for: MG   Uric Acid No results found for: URICACID         Assessment/Plan       Abdominal pain, acute, epigastric      Assessment & Plan  Flank pain-  Possible renal infarct.  Called and d/w radiologist, not definitive for renal infarct on scan.  May be cyst but had some inflammatory changes in area consistent with infarct.  Normal renal function to this point.  UA benign without blood or protein.  Hypercoag w/u sent out per primary.  Volume and electrolytes stable.  No changes at this time.  Would be ok for d/c from renal standpoint with stable labs.    I discussed the patients findings and my recommendations with patient    Grey Rivera MD  04/05/22  10:05 EDT

## 2022-04-05 NOTE — H&P
[facility]    Date of admission: 4/4/2022  Patient Name: Len Giron   1970  Primary Care Physician:  Provider, No Known    Subjective        Chief Complaint   Patient presents with   • Abdominal Pain     Generalized   • Back Pain       HPI:  Len Giron is a 51 y.o. male who presents with progressively worsening abdominal and right flank pain over the past ~1 week.  Patient has a pmhx notable for SARITA on CPAP and morbid obesity.      Patient states last week he started having abdominal pain that was located 'all over', sharp, ~7/10 pain, but noted no focal area of pain that was worse than others, seemed to be worse with deep breaths at times but otherwise had no agrevating or alleviating factors (no change with food, bowel movement, position, activity).  He notes symptoms became progressively worse especially this past Saturday, which eventually prompted him to seek further evaluation as it was not improving.  He denies any associated nausea, vomiting, diarrhea or constipation.  He has been fasting for passover he notes.  He denies any recent changes in medications.  He has a baby aspirin listed for home medication but he notes he only takes this prn for joint pain.      He has not had any fevers or chills.  About 2 weeks ago he notes doing a telehealth visit ([facility]) for a questionable pna, he took a zpak and states symptoms resolved.  He denies any soa/cough/fevers/chills/dysuria.      He has a bilateral frontal headache currently without photophobia.  He notes some arthritis type pain in his shoulders. He has occasional urinary incontinence that he describes somewhat as an urgency/stress incontinence but cant clarify well - this has been going on for months and not changed recently/with current symptoms.    Denies any prior hx of blood clots, autimmune disorders/afib/cardiac arrhythmias/fam hx of clots.    Review of Systems  All systems were  reviewed and negative except as noted in HPI    Personal History     Past Medical History:  SARITA on CPAP  Morbid Obesity  HLD  HTN being evaluated with ambulatory bp monitoring only currently   Active Ambulatory Problems     Diagnosis Date Noted   • Diverticulosis 07/09/2021   • Hemorrhoids 07/09/2021     Resolved Ambulatory Problems     Diagnosis Date Noted   • No Resolved Ambulatory Problems     Past Medical History:   Diagnosis Date   • DDD (degenerative disc disease), lumbar    • Sleep apnea    • Stomach ulcer    • Stool incontinence    • Urine incontinence        Past Surgical History:   Procedure Laterality Date   • APPENDECTOMY     • CARDIAC CATHETERIZATION     • COLONOSCOPY N/A 7/9/2021    Procedure: COLONOSCOPY;  Surgeon: Dorian Chung MD;  Location: [facility];  Service: Gastroenterology;  Laterality: N/A;  diverticulosis/HEMORRHOIDS   • HAND SURGERY Right         Family History:   No blood clots or rheumatologic disorders  Family History   Problem Relation Age of Onset   • Colon cancer Neg Hx    • Malig Hyperthermia Neg Hx        Social History:   Non smoker, rare alcohol, no illicits      Home Medications:  aspirin and cetirizine    Allergies:  No Known Allergies    Objective     Vitals:   Temp:  [98.5 °F (36.9 °C)] 98.5 °F (36.9 °C)  Heart Rate:  [65-79] 72  Resp:  [18] 18  BP: (140)/(86) 140/86    Physical Exam    Constitutional: Awake, alert, conversant   Eyes: Pupils equal, no scleral icterus   HENT: NCAT, mucous membranes moist   Neck: Supple, no thyromegaly   Respiratory: Clear to auscultation bilaterally, nonlabored respirations    Cardiovascular: RRR, no murmurs, rubs, or gallops, le perfused/warm, no leg swelling or calf pain   Gastrointestinal: abdomen is soft, obese, tender x4 quadrants but more so in right flank, +BS    Musculoskeletal: strength symmetric/equal, no cyanosis to extremities   Psychiatric: Appropriate affect, cooperative, no si/hi   Neurologic: Oriented x 3, moves  all extremities spontaneously, speech clear   Skin: No rashes or lesions noted, warm/dry     Result Review    Vital signs, labs and any relevant imaging reviewed.       Assessment / Plan     Acute Right Renal Infarct   Abdominal pain, suspect in setting of above  Diverticulosis  SARITA on CPAP  Morbid Obesity  HLD  HTN being evaluated with ambulatory bp monitoring only currently     -observation admit to telemetry bed  -monitor for afib/arrhythmias  -start on apixaban load 10mg bid  -d/w Dr Porter - he had additional review of imaging by second radiologist and discussed with vasc surgery as well and ct imaging suggestive of renal thrombosis per additional evaluation   -will need to obtain outpatient hypercoaguable workup for majority of tests but will check factor 5 leiden and antiphospholipid antibodies with morning labs as these are reportedly not effected by current acute thrombosis and AC received  -add on inr/pt to existing labs   -vascular surgery was consulted in ED/Dr Catalan; will evaluate in am but reportedly discussed with Dr Porter that he will consider additional ct angiography in the morning but defer for now given contrast used with initial testing  -iv dilaudid given still having severe pain, cont prn, po norco prn if able  -will give iv fluids overnight given contrast  -cont cpap at night  -tylenol prn headache  -check lipid panel in am; pt reportedly stopped his statin because his wife told him it could hurt his kidneys  -pt also takes several supplements, most appear harmless/includ fish oil; but there were a few that he couldn't recall I asked him to get a list of  -monitor for retention, check pvr, may have bph issues   -f/u am labs  -rest per orders      Diet: as tolerated  DVT Prophylaxis: apixaban   Code: full        CT Abdomen Pelvis With Contrast    Result Date: 4/4/2022    1. 3.0 cm x 2.4 cm low density focus in the superior right kidney may represent an underlying cyst.  The presence of  adjacent infiltrative change raises the possibility of superimposed infection or possibly a small renal infarct. 2. Previous appendectomy     Joey Huizar M.D.       Electronically Signed and Approved By: Joey Huizar M.D. on 4/04/2022 at 18:51

## 2022-04-05 NOTE — DISCHARGE SUMMARY
[facility]         HOSPITALIST  DISCHARGE SUMMARY    Patient Name: Len Giron  : 1970  MRN: [id number]    Date of Admission: 2022  Date of Discharge:  2022  Primary Care Physician: Denisha Adam MD      Active and Resolved Hospital Problems:  Acute Right Renal Infarct   Abdominal pain, suspect in setting of above  Diverticulosis  SARITA on CPAP  Morbid Obesity  HLD  HTN being evaluated with ambulatory bp monitoring only currently     Hospital Course     Hospital Course:  Len Giron is a 51 y.o. male  who presents with progressively worsening abdominal and right flank pain over the past week.  Patient has a pmhx notable for SARITA on CPAP, HTN and morbid obesity.    Patient started with a generalized allover abdominal pain, later focalizing to right flank.  CT imaging shows 3.0 cm x 2.4 cm low-density focus in the superior right kidney, this may represent underlying cyst, possible renal infarct.  Case was discussed with vascular surgery, did feel this was renal infarct, patient did not meet criteria for any interventions or revascularization.  Patient with no renal dysfunction,  seen by inpatient by nephrologist, no issues and no follow-up needed.  Discussed with vascular surgery, initiated part of hypercoagulable work-up and patient discharged on Eliquis.  While inpatient, telemetry showed no concern for atrial fibrillation/flutter, patient denied any symptoms of palpitations. Denies any prior hx of blood clots, autimmune disorders/afib/cardiac arrhythmias/fam hx of clots.  Patient has close follow-up with his primary care physician later this week.  While inpatient, factor V Leiden and antiphospholipid antibody panel ordered, at the time of this note being written, these were still in process.    DISCHARGE Follow Up Recommendations for labs and diagnostics:   Follow-up PCP  Hypercoagulable work-up      Day of Discharge     Vital Signs:  Temp:  [97.9 °F (36.6 °C)-99.3 °F  (37.4 °C)] 98.4 °F (36.9 °C)  Heart Rate:  [69-94] 94  Resp:  [18-20] 18  BP: (133-141)/(77-94) 140/94  Physical Exam:               Constitutional: Awake, alert, conversant              Eyes: Pupils equal, no scleral icterus              HENT: NCAT, mucous membranes moist              Neck: Supple, no thyromegaly              Respiratory: Clear to auscultation bilaterally, nonlabored respirations               Cardiovascular: RRR, no murmurs, rubs, or gallops, le perfused/warm, no leg swelling or calf pain              Gastrointestinal: abdomen is soft, obese, tender x4 quadrants but more so in right flank, +BS               Musculoskeletal: strength symmetric/equal, no cyanosis to extremities              Psychiatric: Appropriate affect, cooperative, no si/hi              Neurologic: Oriented x 3, moves all extremities spontaneously, speech clear              Skin: No rashes or lesions noted, warm/dry       Discharge Details        Discharge Medications      New Medications      Instructions Start Date   apixaban 5 MG tablet tablet  Commonly known as: ELIQUIS   Take 2 tablets by mouth 2 (Two) Times a Day for 7 days, THEN 1 tablet 2 (Two) Times a Day for 30 days.   Start Date: April 5, 2022     HYDROcodone-acetaminophen  MG per tablet  Commonly known as: NORCO   1 tablet, Oral, Every 6 Hours PRN         Continue These Medications      Instructions Start Date   BERBERINE COMPLEX PO   1 capsule, Oral, Daily      cetirizine 10 MG tablet  Commonly known as: zyrTEC   10 mg, Oral, Daily PRN      Cinnamon 500 MG capsule   500 mg, Oral, Daily      fish oil 1000 MG capsule capsule   1,000 mg, Oral, Daily With Breakfast      Iman 500 MG capsule   1 capsule, Oral, Daily      Moringa 500 MG capsule   1 capsule, Oral, Daily      Turmeric 500 MG capsule   500 mg, Oral, Daily         Stop These Medications    aspirin 81 MG EC tablet            No Known Allergies    Discharge Disposition:  Home or Self  Care    Diet:  Hospital:No active diet order      Discharge Activity:   Activity Instructions     Activity as Tolerated     Additional Activity Instructions:    As tolerated           CODE STATUS:  Code Status and Medical Interventions:   Ordered at: 04/04/22 2227     Code Status (Patient has no pulse and is not breathing):    CPR (Attempt to Resuscitate)     Medical Interventions (Patient has pulse or is breathing):    Full Support         No future appointments.    Additional Instructions for the Follow-ups that You Need to Schedule     Discharge Follow-up with PCP   As directed       Currently Documented PCP:    Neli Jennings MD    PCP Phone Number:    [phone]     Follow Up Details: In less than one week               Pertinent  and/or Most Recent Results     PROCEDURES:       LAB RESULTS:      Lab 04/05/22  0444 04/04/22  1617   WBC 10.52 9.04   HEMOGLOBIN 14.6 15.6   HEMATOCRIT 43.7 45.7   PLATELETS 238 260   NEUTROS ABS 6.21 5.82   IMMATURE GRANS (ABS) 0.02 0.03   LYMPHS ABS 2.33 2.00   MONOS ABS 1.69* 0.91*   EOS ABS 0.22 0.24   MCV 84.5 84.3   PROTIME  --  11.2         Lab 04/05/22  0444 04/04/22  1617   SODIUM 136 140   POTASSIUM 4.0 4.1   CHLORIDE 103 103   CO2 22.2 25.4   ANION GAP 10.8 11.6   BUN 13 15   CREATININE 1.00 1.08   EGFR 90.6 82.6   GLUCOSE 96 126*   CALCIUM 9.0 9.7         Lab 04/04/22  1617   TOTAL PROTEIN 7.9   ALBUMIN 4.80   GLOBULIN 3.1   ALT (SGPT) 32   AST (SGOT) 30   BILIRUBIN 0.7   ALK PHOS 82   LIPASE 21         Lab 04/04/22  1617   PROTIME 11.2   INR 1.09*                 Brief Urine Lab Results  (Last result in the past 365 days)      Color   Clarity   Blood   Leuk Est   Nitrite   Protein   CREAT   Urine HCG        04/04/22 1742 Yellow   Clear   Negative   Negative   Negative   Negative               Microbiology Results (last 10 days)     ** No results found for the last 240 hours. **          CT Abdomen Pelvis With Contrast    Result Date: 4/4/2022  Impression:   1.  3.0 cm x 2.4 cm low density focus in the superior right kidney may represent an underlying cyst.  The presence of adjacent infiltrative change raises the possibility of superimposed infection or possibly a small renal infarct. 2. Previous appendectomy     Joey Huizar M.D.       Electronically Signed and Approved By: Joey Huizar M.D. on 4/04/2022 at 18:51                           Labs Pending at Discharge:  Pending Labs     Order Current Status    Antiphosphotidyl Antibodies Panel II In process    Factor 5 Leiden In process            Time spent on Discharge including face to face service:  35 minutes    Electronically signed by Porfirio Khan MD, 04/05/22, 7:32 PM EDT.

## 2022-04-05 NOTE — DISCHARGE INSTR - DIET
Problem: Patient Care Overview  Goal: Plan of Care Review  Outcome: Ongoing (interventions implemented as appropriate)  Patient remained in stable condition today.  She was able to get up and go into the bathroom with assistance from daughter and/or nurse.  She was continent of her bowels and bladder today, with only one accident after she worked with PT. She still has a productive, frequent cough. She sat up in the chair for meals for at least an hour at a time. Vital sign remain stable.  Family at bedside through shift. Bed in lowest and locked position. Call light in reach.        Regular

## 2022-04-05 NOTE — SIGNIFICANT NOTE
04/05/22 1540   Plan   Plan BRIGITTE scheduled appointment with pts PCP Dr. Adam for Thursday 4/7/22 at 2:00 p.m. [facility] BRIGITTE notified pt of appointment.

## 2022-04-06 ENCOUNTER — READMISSION MANAGEMENT (OUTPATIENT)
Dept: CALL CENTER | Facility: HOSPITAL | Age: 52
End: 2022-04-06

## 2022-04-06 LAB — F5 GENE MUT ANL BLD/T: NORMAL

## 2022-04-14 LAB
ANTI-PHOSPHATIDIC ACID: ABNORMAL
ANTI-PHOSPHATIDYL GLYCEROL: ABNORMAL
ANTI-PHOSPHATIDYL INOSITOL: ABNORMAL
ANTI-PHOSPHATIDYLETHANOLAMINE: ABNORMAL
PE IGA SER-ACNC: 5.5 U/ML
PE IGG SER-ACNC: 1 U/ML
PE IGM SER-ACNC: 4.1 U/ML
PG IGA SER-ACNC: 3.2 U/ML
PG IGG SER-ACNC: 5.7 U/ML
PG IGM SER-ACNC: 6 U/ML
PHOSPHATIDATE IGA SER-ACNC: 5.8 U/ML
PHOSPHATIDATE IGG SER-ACNC: 8.9 U/ML
PHOSPHATIDATE IGM SER-ACNC: 15 U/ML
PI IGA SER-ACNC: 4.9 U/ML
PI IGG SER-ACNC: 7 U/ML
PI IGM SER-ACNC: 7.2 U/ML

## 2022-06-07 ENCOUNTER — APPOINTMENT (OUTPATIENT)
Dept: GENERAL RADIOLOGY | Facility: HOSPITAL | Age: 52
End: 2022-06-07

## 2022-06-07 ENCOUNTER — HOSPITAL ENCOUNTER (EMERGENCY)
Facility: HOSPITAL | Age: 52
Discharge: HOME | End: 2022-06-07
Attending: EMERGENCY MEDICINE | Admitting: EMERGENCY MEDICINE

## 2022-06-07 VITALS
TEMPERATURE: 99.4 F | DIASTOLIC BLOOD PRESSURE: 88 MMHG | OXYGEN SATURATION: 98 % | HEART RATE: 89 BPM | RESPIRATION RATE: 22 BRPM | BODY MASS INDEX: 34.62 KG/M2 | SYSTOLIC BLOOD PRESSURE: 117 MMHG | HEIGHT: 73 IN | WEIGHT: 261.25 LBS

## 2022-06-07 DIAGNOSIS — M54.6 ACUTE BILATERAL THORACIC BACK PAIN: ICD-10-CM

## 2022-06-07 DIAGNOSIS — J40 BRONCHITIS: Primary | ICD-10-CM

## 2022-06-07 DIAGNOSIS — R30.0 DYSURIA: ICD-10-CM

## 2022-06-07 LAB
BILIRUB UR QL STRIP: NEGATIVE
CLARITY UR: CLEAR
COLOR UR: YELLOW
FLUAV AG NPH QL: NEGATIVE
FLUBV AG NPH QL IA: NEGATIVE
GLUCOSE UR STRIP-MCNC: NEGATIVE MG/DL
HGB UR QL STRIP.AUTO: NEGATIVE
KETONES UR QL STRIP: NEGATIVE
LEUKOCYTE ESTERASE UR QL STRIP.AUTO: NEGATIVE
NITRITE UR QL STRIP: NEGATIVE
PH UR STRIP.AUTO: <=5 [PH] (ref 5–8)
PROT UR QL STRIP: NEGATIVE
SP GR UR STRIP: 1.02 (ref 1–1.03)
UROBILINOGEN UR QL STRIP: NORMAL

## 2022-06-07 PROCEDURE — 99283 EMERGENCY DEPT VISIT LOW MDM: CPT

## 2022-06-07 PROCEDURE — 63710000001 PREDNISONE PER 5 MG: Performed by: NURSE PRACTITIONER

## 2022-06-07 PROCEDURE — 87804 INFLUENZA ASSAY W/OPTIC: CPT | Performed by: EMERGENCY MEDICINE

## 2022-06-07 PROCEDURE — 81003 URINALYSIS AUTO W/O SCOPE: CPT

## 2022-06-07 PROCEDURE — 71045 X-RAY EXAM CHEST 1 VIEW: CPT

## 2022-06-07 RX ORDER — TRAMADOL HYDROCHLORIDE 50 MG/1
50 TABLET ORAL ONCE
Status: COMPLETED | OUTPATIENT
Start: 2022-06-07 | End: 2022-06-07

## 2022-06-07 RX ORDER — IBUPROFEN 800 MG/1
800 TABLET ORAL EVERY 6 HOURS PRN
Qty: 30 TABLET | Refills: 0 | Status: SHIPPED | OUTPATIENT
Start: 2022-06-07 | End: 2022-06-07 | Stop reason: ALTCHOICE

## 2022-06-07 RX ORDER — BROMPHENIRAMINE MALEATE, PSEUDOEPHEDRINE HYDROCHLORIDE, AND DEXTROMETHORPHAN HYDROBROMIDE 2; 30; 10 MG/5ML; MG/5ML; MG/5ML
10 SYRUP ORAL 4 TIMES DAILY PRN
Qty: 473 ML | Refills: 0 | OUTPATIENT
Start: 2022-06-07 | End: 2023-04-04

## 2022-06-07 RX ORDER — PREDNISONE 20 MG/1
60 TABLET ORAL DAILY
Qty: 15 TABLET | Refills: 0 | Status: SHIPPED | OUTPATIENT
Start: 2022-06-07 | End: 2022-06-12

## 2022-06-07 RX ORDER — PHENAZOPYRIDINE HYDROCHLORIDE 200 MG/1
200 TABLET, FILM COATED ORAL 3 TIMES DAILY
Qty: 6 TABLET | Refills: 0 | Status: SHIPPED | OUTPATIENT
Start: 2022-06-07 | End: 2022-06-09

## 2022-06-07 RX ORDER — AZITHROMYCIN 250 MG/1
TABLET, FILM COATED ORAL
Qty: 6 TABLET | Refills: 0 | OUTPATIENT
Start: 2022-06-07 | End: 2023-04-04

## 2022-06-07 RX ORDER — IBUPROFEN 400 MG/1
800 TABLET ORAL ONCE
Status: DISCONTINUED | OUTPATIENT
Start: 2022-06-07 | End: 2022-06-07

## 2022-06-07 RX ORDER — CYCLOBENZAPRINE HCL 10 MG
10 TABLET ORAL 3 TIMES DAILY PRN
Qty: 15 TABLET | Refills: 0 | OUTPATIENT
Start: 2022-06-07 | End: 2023-04-04

## 2022-06-07 RX ADMIN — PREDNISONE 60 MG: 50 TABLET ORAL at 07:12

## 2022-06-07 RX ADMIN — TRAMADOL HYDROCHLORIDE 50 MG: 50 TABLET, COATED ORAL at 07:12

## 2022-06-07 NOTE — DISCHARGE INSTRUCTIONS
Chest x-ray was negative.  Flu was negative.  Urine was normal.    Occasions as prescribed for symptomatic treatment.    Follow-up with PCP

## 2022-06-07 NOTE — ED PROVIDER NOTES
Time: 06:14 EDT  Arrived by: Private vehicle  Chief Complaint: Cough and congestion  History provided by: Patient  History is limited by: N/A    History of Present Illness:  Patient is a 52 y.o. year old male that presents to the emergency department with cough and congestion      History provided by:  Patient  Back Pain  Location:  Thoracic spine  Quality:  Aching  Radiates to:  Does not radiate  Pain severity:  Moderate  Pain is:  Unable to specify  Onset quality:  Gradual  Duration:  3 days  Timing:  Constant  Progression:  Unchanged  Chronicity:  New  Context comment:  No known injury.  Patient basically has cough and cold symptoms  Relieved by:  Nothing  Worsened by:  Coughing and movement  Ineffective treatments:  None tried  Associated symptoms: dysuria ( Burning with urination), fever ( Subjective) and headaches    Associated symptoms: no abdominal pain, no abdominal swelling, no bladder incontinence, no bowel incontinence, no chest pain, no leg pain, no numbness, no paresthesias, no pelvic pain, no perianal numbness, no tingling and no weakness    Cough  Associated symptoms: diaphoresis ( Patient states he woke up at night with sweats), fever ( Subjective), headaches, myalgias, shortness of breath ( At times) and wheezing ( Times)    Associated symptoms: no chest pain, no chills, no ear pain, no rhinorrhea and no sore throat    Fever  Associated symptoms: congestion, cough, dysuria ( Burning with urination), headaches and myalgias    Associated symptoms: no chest pain, no chills, no diarrhea, no ear pain, no nausea, no rhinorrhea, no sore throat and no vomiting    Headache  Associated symptoms: back pain, congestion, cough, fever ( Subjective) and myalgias    Associated symptoms: no abdominal pain, no diarrhea, no ear pain, no nausea, no neck pain, no numbness, no paresthesias, no sinus pressure, no sore throat, no vomiting and no weakness    Difficulty Urinating  Presenting symptoms: dysuria ( Burning with  urination)    Presenting symptoms: no penile discharge and no penile pain    Associated symptoms: fever ( Subjective)    Associated symptoms: no abdominal pain, no diarrhea, no flank pain, no nausea, no penile swelling, no scrotal swelling, no urinary frequency, no urinary incontinence and no vomiting        Similar Symptoms Previously: Occasional    Recently seen: No      Patient Care Team  Primary Care Provider: dr marc    Past Medical History:     No Known Allergies  Past Medical History:   Diagnosis Date   • DDD (degenerative disc disease), lumbar    • Sleep apnea    • Stomach ulcer    • Stool incontinence    • Urine incontinence      Past Surgical History:   Procedure Laterality Date   • APPENDECTOMY     • CARDIAC CATHETERIZATION     • COLONOSCOPY N/A 7/9/2021    Procedure: COLONOSCOPY;  Surgeon: Dorian Chung MD;  Location: [facility] END[facility];  Service: Gastroenterology;  Laterality: N/A;  diverticulosis/HEMORRHOIDS   • HAND SURGERY Right      Family History   Problem Relation Age of Onset   • Colon cancer Neg Hx    • Malig Hyperthermia Neg Hx        Home Medications:  Prior to Admission medications    Medication Sig Start Date End Date Taking? Authorizing Provider   Barberry-Oreg Grape-Goldenseal (BERBERINE COMPLEX PO) Take 1 capsule by mouth Daily.    Caleb Porter MD   cetirizine (zyrTEC) 10 MG tablet Take 10 mg by mouth Daily As Needed for Allergies or Rhinitis.    Caleb Porter MD   Cinnamon 500 MG capsule Take 500 mg by mouth Daily.    Caleb Porter MD   Ginger 500 MG capsule Take 1 capsule by mouth Daily.    Caleb Porter MD   Moringa 500 MG capsule Take 1 capsule by mouth Daily.    Caleb Porter MD   Omega-3 Fatty Acids (fish oil) 1000 MG capsule capsule Take 1,000 mg by mouth Daily With Breakfast.    Caleb Porter MD   Turmeric 500 MG capsule Take 500 mg by mouth Daily.    Caleb Porter MD        Social History:   PT  reports that he quit  "smoking about 7 years ago. He quit smokeless tobacco use about 5 years ago.  His smokeless tobacco use included chew. He reports that he does not drink alcohol and does not use drugs.    Record Review:  I have reviewed the patient's records in [facility].     Review of Systems  Review of Systems   Constitutional: Positive for diaphoresis ( Patient states he woke up at night with sweats) and fever ( Subjective). Negative for chills.   HENT: Positive for congestion. Negative for ear pain, rhinorrhea, sinus pressure, sinus pain and sore throat.    Eyes: Negative.    Respiratory: Positive for cough, shortness of breath ( At times) and wheezing ( Times).    Cardiovascular: Negative for chest pain, palpitations and leg swelling.   Gastrointestinal: Negative for abdominal pain, bowel incontinence, diarrhea, nausea and vomiting.   Genitourinary: Positive for difficulty urinating and dysuria ( Burning with urination). Negative for bladder incontinence, flank pain, frequency, pelvic pain, penile discharge, penile pain, penile swelling, scrotal swelling and testicular pain.   Musculoskeletal: Positive for back pain and myalgias. Negative for neck pain.   Skin: Negative.    Neurological: Positive for headaches. Negative for tingling, weakness, numbness and paresthesias.   Hematological: Negative.    Psychiatric/Behavioral: Negative.         Physical Exam  /88 (BP Location: Right arm, Patient Position: Sitting)   Pulse 89   Temp 99.4 °F (37.4 °C) (Oral)   Resp 22   Ht 185.4 cm (73\")   Wt 118 kg (261 lb 3.9 oz)   SpO2 98%   BMI 34.47 kg/m²     Physical Exam  Vitals and nursing note reviewed.   Constitutional:       General: He is not in acute distress.     Appearance: Normal appearance. He is not toxic-appearing.   HENT:      Head: Normocephalic and atraumatic.      Right Ear: Ear canal and external ear normal.      Left Ear: Tympanic membrane, ear canal and external ear normal.      Nose: Congestion present.      " "Mouth/Throat:      Mouth: Mucous membranes are moist.      Pharynx: Posterior oropharyngeal erythema present.   Eyes:      General: No scleral icterus.     Conjunctiva/sclera: Conjunctivae normal.      Pupils: Pupils are equal, round, and reactive to light.   Cardiovascular:      Rate and Rhythm: Normal rate and regular rhythm.      Pulses: Normal pulses.      Heart sounds: Normal heart sounds.   Pulmonary:      Effort: Pulmonary effort is normal. No respiratory distress.      Breath sounds: Normal breath sounds.      Comments: Congested cough noted  Chest:      Chest wall: No tenderness.   Abdominal:      General: Bowel sounds are normal.      Palpations: Abdomen is soft.      Tenderness: There is no abdominal tenderness.   Musculoskeletal:         General: Normal range of motion.      Cervical back: Normal range of motion and neck supple.      Right lower leg: No edema.      Left lower leg: No edema.   Lymphadenopathy:      Cervical: No cervical adenopathy.   Skin:     General: Skin is warm and dry.   Neurological:      Mental Status: He is alert and oriented to person, place, and time.   Psychiatric:         Mood and Affect: Mood normal.         Behavior: Behavior normal.          ED Course  /88 (BP Location: Right arm, Patient Position: Sitting)   Pulse 89   Temp 99.4 °F (37.4 °C) (Oral)   Resp 22   Ht 185.4 cm (73\")   Wt 118 kg (261 lb 3.9 oz)   SpO2 98%   BMI 34.47 kg/m²   Results for orders placed or performed during the hospital encounter of 06/07/22   Influenza Antigen, Rapid - Swab, Nasopharynx    Specimen: Nasopharynx; Swab   Result Value Ref Range    Influenza A Ag, EIA Negative Negative    Influenza B Ag, EIA Negative Negative   Urinalysis With Culture If Indicated - Urine, Clean Catch    Specimen: Urine, Clean Catch   Result Value Ref Range    Color, UA Yellow Yellow, Straw    Appearance, UA Clear Clear    pH, UA <=5.0 5.0 - 8.0    Specific Gravity, UA 1.024 1.005 - 1.030    Glucose, UA " Negative Negative    Ketones, UA Negative Negative    Bilirubin, UA Negative Negative    Blood, UA Negative Negative    Protein, UA Negative Negative    Leuk Esterase, UA Negative Negative    Nitrite, UA Negative Negative    Urobilinogen, UA 0.2 E.U./dL 0.2 - 1.0 E.U./dL     Medications   predniSONE (DELTASONE) tablet 60 mg (has no administration in time range)   ibuprofen (ADVIL,MOTRIN) tablet 800 mg (has no administration in time range)     XR Chest 1 View    Result Date: 6/7/2022  Narrative: PROCEDURE: XR CHEST 1 VW  COMPARISON: None.  INDICATIONS: fever/cough  FINDINGS:  A single AP upright portable chest radiograph was performed.  No cardiac enlargement is seen.  No acute infiltrate is appreciated.  No pleural effusion or pneumothorax is identified.  External artifacts obscure detail.  There may be slightly low lung volumes with bibasilar subsegmental atelectasis.  The thoracic aorta is mildly ectatic.  No significant interval change is seen since the prior study.      Impression:   No acute infiltrate is appreciated.      COMMENT:  Part of this note is an electronic transcription of spoken language to printed text. The electronic translation/transcription may permit erroneous, or at times, nonsensical (or even sensical) words or phrases to be inadvertently transcribed or omitted; this  has reviewed the note for such errors (as well as additional errors); however, some may still exist.  JOSS ALVARENGA JR, MD       Electronically Signed and Approved By: JOSS ALVARENGA JR, MD on 6/07/2022 at 5:55                Medical Decision Making:                     MDM  Number of Diagnoses or Management Options  Bronchitis  Dysuria  Diagnosis management comments: I have spoken with the patient. I have explained the patient´s condition, diagnoses and treatment plan based on the information available to me at this time. I have answered the patient's questions and addressed any concerns. The patient has a good   understanding of the patient´s diagnosis, condition, and treatment plan as can be expected at this point. The vital signs have been stable. The patient´s condition is stable and appropriate for discharge from the emergency department.      The patient will pursue further outpatient evaluation with the primary care physician or other designated or consulting physician as outlined in the discharge instructions. They are agreeable to this plan of care and follow-up instructions have been explained in detail. The patient has received these instructions in written format and have expressed an understanding of the discharge instructions. The patient is aware that any significant change in condition or worsening of symptoms should prompt an immediate return to this or the closest emergency department or call to 911.         Amount and/or Complexity of Data Reviewed  Clinical lab tests: reviewed and ordered  Tests in the radiology section of CPT®: reviewed and ordered  Tests in the medicine section of CPT®: ordered and reviewed    Risk of Complications, Morbidity, and/or Mortality  Presenting problems: low  Diagnostic procedures: low  Management options: low    Patient Progress  Patient progress: stable       Final diagnoses:   Bronchitis   Dysuria        Disposition:  ED Disposition     ED Disposition   Discharge    Condition   Stable    Comment   --              Yumiko Marquez, JIN  06/07/22 0614

## 2022-06-22 ENCOUNTER — TRANSCRIBE ORDERS (OUTPATIENT)
Dept: ADMINISTRATIVE | Facility: HOSPITAL | Age: 52
End: 2022-06-22

## 2022-06-22 DIAGNOSIS — I25.9 MYOCARDIAL ISCHEMIA: Primary | ICD-10-CM

## 2022-07-06 ENCOUNTER — APPOINTMENT (OUTPATIENT)
Dept: CARDIOLOGY | Facility: HOSPITAL | Age: 52
End: 2022-07-06

## 2022-07-08 ENCOUNTER — HOSPITAL ENCOUNTER (OUTPATIENT)
Dept: CARDIOLOGY | Facility: HOSPITAL | Age: 52
Discharge: HOME | End: 2022-07-08
Admitting: INTERNAL MEDICINE

## 2022-07-08 DIAGNOSIS — I25.9 MYOCARDIAL ISCHEMIA: ICD-10-CM

## 2022-07-08 LAB
BH CV ECHO MEAS - AO ROOT DIAM: 3.4 CM
BH CV ECHO MEAS - EF(MOD-BP): 56.5 %
BH CV ECHO MEAS - IVSD: 1.2 CM
BH CV ECHO MEAS - LA DIMENSION: 4.1 CM
BH CV ECHO MEAS - LAT PEAK E' VEL: 12.4 CM/SEC
BH CV ECHO MEAS - LVIDD: 4.6 CM
BH CV ECHO MEAS - LVIDS: 3.3 CM
BH CV ECHO MEAS - LVOT DIAM: 2.7 CM
BH CV ECHO MEAS - LVPWD: 1.2 CM
BH CV ECHO MEAS - MED PEAK E' VEL: 8.49 CM/SEC
BH CV ECHO MEAS - MV A MAX VEL: 58.8 CM/SEC
BH CV ECHO MEAS - MV DEC TIME: 306 MSEC
BH CV ECHO MEAS - MV E MAX VEL: 65.8 CM/SEC
BH CV ECHO MEAS - MV E/A: 1.1
BH CV ECHO MEASUREMENTS AVERAGE E/E' RATIO: 6.3
IVRT: 74 MSEC
LEFT ATRIUM VOLUME INDEX: 34.5 ML/M2
MAXIMAL PREDICTED HEART RATE: 167 BPM
STRESS TARGET HR: 142 BPM

## 2022-07-08 PROCEDURE — 93306 TTE W/DOPPLER COMPLETE: CPT

## 2022-07-08 PROCEDURE — 93306 TTE W/DOPPLER COMPLETE: CPT | Performed by: INTERNAL MEDICINE

## 2022-08-18 ENCOUNTER — OFFICE VISIT (OUTPATIENT)
Dept: NEUROSURGERY | Facility: CLINIC | Age: 52
End: 2022-08-18

## 2022-08-18 VITALS
WEIGHT: 270 LBS | BODY MASS INDEX: 35.78 KG/M2 | HEIGHT: 73 IN | SYSTOLIC BLOOD PRESSURE: 122 MMHG | HEART RATE: 69 BPM | DIASTOLIC BLOOD PRESSURE: 85 MMHG

## 2022-08-18 DIAGNOSIS — M43.16 SPONDYLOLISTHESIS AT L1-L2 LEVEL: ICD-10-CM

## 2022-08-18 DIAGNOSIS — M47.816 FACET ARTHROPATHY, LUMBAR: ICD-10-CM

## 2022-08-18 DIAGNOSIS — M48.061 FORAMINAL STENOSIS OF LUMBAR REGION: ICD-10-CM

## 2022-08-18 DIAGNOSIS — M51.36 DDD (DEGENERATIVE DISC DISEASE), LUMBAR: Primary | ICD-10-CM

## 2022-08-18 PROCEDURE — 99204 OFFICE O/P NEW MOD 45 MIN: CPT | Performed by: PHYSICIAN ASSISTANT

## 2022-08-18 NOTE — PROGRESS NOTES
"Chief Complaint  Back Pain and Leg Pain (Right to knee)    Subjective          Len Giron who is a 52 y.o. year old male who presents to [facility] for Evaluation of the Spine.     The patient complains of pain located in the Lumbar Spine.  Patients states the pain has been present for years.  The pain came on gradually.  The pain scaled level is 4.  The pain does radiate. Dermatomes are located on right Lumbar at: not below the knee.  The pain is Intermittent and described as dull.  The pain is worse at nighttime and awakens the patient at night. Patient states icey-hot makes the pain better.  Patient states laying on the bed a certain way and then trying to get up makes the pain worse.    Associated Symptoms Include: He does report on and off loss of bowel and bladder control intermittently for the last 2 months.  Conservative Interventions Include: Spinal ablation which was somewhat effective, Icey-hot which is somewhat effective.    Was this the result of an injury or accident?: No    History of Previous Spinal Surgery?: No     reports that he quit smoking about 8 years ago. He quit smokeless tobacco use about 6 years ago.  His smokeless tobacco use included chew.    Review of Systems   Musculoskeletal: Positive for back pain and gait problem.        Objective   Vital Signs:   /85   Pulse 69   Ht 185.4 cm (73\")   Wt 122 kg (270 lb)   BMI 35.62 kg/m²       Physical Exam  Constitutional:       Appearance: Normal appearance.   Pulmonary:      Effort: Pulmonary effort is normal.   Musculoskeletal:         General: Tenderness (left lumbar paraspinals) present.      Comments: SLR negative bilaterally   Neurological:      General: No focal deficit present.      Mental Status: He is alert and oriented to person, place, and time.      Sensory: No sensory deficit.      Motor: No weakness.      Deep Tendon Reflexes: Reflexes normal.   Psychiatric:         Mood and " Affect: Mood normal.         Behavior: Behavior normal.        Neurologic Exam     Mental Status   Oriented to person, place, and time.        Result Review     I have personally reviewed the MRI of lumbar spine without contrast from 7/6/2022 which shows multilevel degenerative disc disease and facet arthritis, there is moderate central canal stenosis at L1-L2, there is mild central canal stenosis at L2-L3 and L3-L4.  There is mild bilateral foraminal stenosis at L1-L2, at L2-L3 there is mild to moderate foraminal stenosis on the left and mild foraminal stenosis on the right,, L3-L4 and L4-L5 there is moderate bilateral foraminal stenosis, there is minimal foraminal stenosis at the L5-S1 level.  There is mild retrolisthesis of L1 on L2.     Assessment and Plan    Diagnoses and all orders for this visit:    1. DDD (degenerative disc disease), lumbar (Primary)    2. Foraminal stenosis of lumbar region    3. Facet arthropathy, lumbar    4. Spondylolisthesis at L1-L2 level    He does have pain into the right leg to the knee with moderate foraminal stenosis on the right at L3-L4 which may account for this pain.    His bigger concern is the intermittent loss of bowel and bladder control for the last 2 months. I expect the change in his lumbar spine that would possibly contribute to this to be the central canal stenosis at the L1-L2 level and retrolisthesis of L1 on L2.    We did discuss ordering a flexion and extension x-ray of the lumbar spine to evaluate the stability at L1 on L2.    We did discuss the possibility of trialing LESB to assess benefit for his complaints.    He is going to discuss this further with Dr. Nazario to get his opinion on the best way to move forward.    Follow Up   No follow-ups on file.  Patient was given instructions and counseling regarding his condition or for health maintenance advice. Please see specific information pulled into the AVS if appropriate.     His MRI findings are not  significant enough to account for cauda equina syndrome. His incontinence also comes and goes. He doesn't report any genital or perineal numbness. He has normal strength in the lower extremities and normal sensation which are also not consistent with cauda equina syndrome. He reports the symptoms started after he had a colonoscopy. I would consider urology and or gastroenterology evals.

## 2024-02-23 ENCOUNTER — APPOINTMENT (OUTPATIENT)
Dept: GENERAL RADIOLOGY | Facility: HOSPITAL | Age: 54
End: 2024-02-23
Payer: OTHER GOVERNMENT

## 2024-02-23 ENCOUNTER — HOSPITAL ENCOUNTER (EMERGENCY)
Facility: HOSPITAL | Age: 54
Discharge: HOME | End: 2024-02-23
Attending: EMERGENCY MEDICINE
Payer: OTHER GOVERNMENT

## 2024-02-23 VITALS
WEIGHT: 275 LBS | RESPIRATION RATE: 20 BRPM | DIASTOLIC BLOOD PRESSURE: 80 MMHG | SYSTOLIC BLOOD PRESSURE: 133 MMHG | TEMPERATURE: 99.8 F | HEART RATE: 107 BPM | BODY MASS INDEX: 36.45 KG/M2 | HEIGHT: 73 IN | OXYGEN SATURATION: 100 %

## 2024-02-23 DIAGNOSIS — J40 BRONCHITIS: ICD-10-CM

## 2024-02-23 DIAGNOSIS — U07.1 COVID-19 VIRUS DETECTED: Primary | ICD-10-CM

## 2024-02-23 LAB
FLUAV SUBTYP SPEC NAA+PROBE: NOT DETECTED
FLUBV RNA ISLT QL NAA+PROBE: NOT DETECTED
RSV RNA NPH QL NAA+NON-PROBE: NOT DETECTED
S PYO AG THROAT QL: NEGATIVE
SARS-COV-2 RNA RESP QL NAA+PROBE: DETECTED

## 2024-02-23 PROCEDURE — 71045 X-RAY EXAM CHEST 1 VIEW: CPT

## 2024-02-23 PROCEDURE — 87880 STREP A ASSAY W/OPTIC: CPT | Performed by: NURSE PRACTITIONER

## 2024-02-23 PROCEDURE — 99283 EMERGENCY DEPT VISIT LOW MDM: CPT

## 2024-02-23 PROCEDURE — 87081 CULTURE SCREEN ONLY: CPT | Performed by: NURSE PRACTITIONER

## 2024-02-23 PROCEDURE — 87637 SARSCOV2&INF A&B&RSV AMP PRB: CPT | Performed by: EMERGENCY MEDICINE

## 2024-02-23 PROCEDURE — 63710000001 ONDANSETRON ODT 4 MG TABLET DISPERSIBLE

## 2024-02-23 RX ORDER — ALBUTEROL SULFATE 90 UG/1
2 AEROSOL, METERED RESPIRATORY (INHALATION) EVERY 6 HOURS PRN
Qty: 18 G | Refills: 0 | Status: SHIPPED | OUTPATIENT
Start: 2024-02-23

## 2024-02-23 RX ORDER — DEXAMETHASONE 4 MG/1
4 TABLET ORAL 2 TIMES DAILY WITH MEALS
Qty: 14 TABLET | Refills: 0 | Status: SHIPPED | OUTPATIENT
Start: 2024-02-23 | End: 2024-03-01

## 2024-02-23 RX ORDER — ONDANSETRON 4 MG/1
4 TABLET, ORALLY DISINTEGRATING ORAL 4 TIMES DAILY PRN
Qty: 15 TABLET | Refills: 0 | Status: SHIPPED | OUTPATIENT
Start: 2024-02-23

## 2024-02-23 RX ORDER — ONDANSETRON 4 MG/1
4 TABLET, ORALLY DISINTEGRATING ORAL ONCE
Status: COMPLETED | OUTPATIENT
Start: 2024-02-23 | End: 2024-02-23

## 2024-02-23 RX ORDER — IBUPROFEN 400 MG/1
800 TABLET ORAL ONCE
Status: DISCONTINUED | OUTPATIENT
Start: 2024-02-23 | End: 2024-02-23 | Stop reason: HOSPADM

## 2024-02-23 RX ORDER — DEXTROMETHORPHAN HYDROBROMIDE AND PROMETHAZINE HYDROCHLORIDE 15; 6.25 MG/5ML; MG/5ML
5 SYRUP ORAL 4 TIMES DAILY PRN
Qty: 180 ML | Refills: 0 | Status: SHIPPED | OUTPATIENT
Start: 2024-02-23

## 2024-02-23 RX ORDER — ACETAMINOPHEN 500 MG
1000 TABLET ORAL ONCE
Status: COMPLETED | OUTPATIENT
Start: 2024-02-23 | End: 2024-02-23

## 2024-02-23 RX ORDER — BENZONATATE 200 MG/1
200 CAPSULE ORAL 3 TIMES DAILY PRN
Qty: 40 CAPSULE | Refills: 0 | Status: SHIPPED | OUTPATIENT
Start: 2024-02-23

## 2024-02-23 RX ADMIN — ACETAMINOPHEN 1000 MG: 500 TABLET ORAL at 01:20

## 2024-02-23 RX ADMIN — ONDANSETRON 4 MG: 4 TABLET, ORALLY DISINTEGRATING ORAL at 01:20

## 2024-02-23 NOTE — Clinical Note
[facility] EMERGENCY ROOM  [address] [place] [address]  Phone: [phone]  Fax: [phone]    Len Giron was seen and treated in our emergency department on 2/23/2024.  He may return to work on 02/28/2024.         Thank you for choosing [facility].    Yumiko Marquez APRN

## 2024-02-23 NOTE — ED PROVIDER NOTES
Time: 2:02 AM EST  Date of encounter:  2/23/2024  Independent Historian/Clinical History and Information was obtained by:   Patient    History is limited by: N/A    Chief Complaint: Cough, fever, nausea      History of Present Illness:  Patient is a 53 y.o. year old male who presents to the emergency department for evaluation of patient has cough congestion and sore throat for the past 2 days that worsened tonight.  Has been having fever and chills temperature as high as 102 here in the emergency department.  Patient complaining of productive cough with yellow sputum.  Some nausea.  Occasional episodes of diarrhea.  Unknown sick contacts.  Feels short of breath at times.  No wheezing.  No abdominal pain.  No dysuria    HPI    Patient Care Team  Primary Care Provider: Denisha Adam MD    Past Medical History:     No Known Allergies  Past Medical History:   Diagnosis Date    DDD (degenerative disc disease), lumbar     Sleep apnea     Stomach ulcer     Stool incontinence     Urine incontinence      Past Surgical History:   Procedure Laterality Date    APPENDECTOMY      CARDIAC CATHETERIZATION      COLONOSCOPY N/A 7/9/2021    Procedure: COLONOSCOPY;  Surgeon: Dorian Chung MD;  Location: [facility] ENDOSCOPY;  Service: Gastroenterology;  Laterality: N/A;  diverticulosis/HEMORRHOIDS    HAND SURGERY Right      Family History   Problem Relation Age of Onset    Colon cancer Neg Hx     Malig Hyperthermia Neg Hx        Home Medications:  Prior to Admission medications    Medication Sig Start Date End Date Taking? Authorizing Provider   apixaban (ELIQUIS) 2.5 MG tablet tablet Take 1 tablet by mouth 2 (Two) Times a Day.    Provider, MD Caleb   azithromycin (Zithromax Z-Abdoulaye) 250 MG tablet Take 2 tab po today then take 1 tab po qd x 4 days 4/4/23   Samuel Rivera MD   benzonatate (TESSALON) 200 MG capsule Take 1 capsule by mouth 3 (Three) Times a Day As Needed for Cough. 4/4/23   Samuel Rivera MD   cetirizine  "(zyrTEC) 10 MG tablet Take 1 tablet by mouth Daily As Needed for Allergies or Rhinitis.    Provider, MD Caleb   promethazine-dextromethorphan (PROMETHAZINE-DM) 6.25-15 MG/5ML syrup Take 5 mL by mouth 4 (Four) Times a Day As Needed for Cough. 4/4/23   Samuel Rivera MD        Social History:   Social History     Tobacco Use    Smoking status: Former    Smokeless tobacco: Former     Types: Chew     Quit date: 7/8/2016   Substance Use Topics    Alcohol use: Never    Drug use: Never         Review of Systems:  Review of Systems   Constitutional:  Positive for chills and fever.   HENT:  Positive for congestion. Negative for ear pain and sore throat.    Eyes:  Negative for pain.   Respiratory:  Positive for cough and shortness of breath. Negative for chest tightness.    Cardiovascular:  Negative for chest pain.   Gastrointestinal:  Positive for nausea. Negative for abdominal pain, diarrhea and vomiting.   Genitourinary:  Negative for flank pain and hematuria.   Musculoskeletal:  Positive for myalgias. Negative for joint swelling.   Skin:  Negative for pallor.   Neurological:  Negative for seizures and headaches.   Hematological: Negative.    Psychiatric/Behavioral: Negative.     All other systems reviewed and are negative.       Physical Exam:  /80 (BP Location: Right arm, Patient Position: Sitting)   Pulse 107   Temp 99.8 °F (37.7 °C) (Oral) Comment: Simultaneous filing. User may be unaware of other data.  Resp 20   Ht 185.4 cm (73\")   Wt 125 kg (275 lb)   SpO2 100%   BMI 36.28 kg/m²     Physical Exam  Vitals and nursing note reviewed.   Constitutional:       General: He is not in acute distress.     Appearance: He is obese. He is not toxic-appearing.   HENT:      Head: Normocephalic and atraumatic.      Right Ear: Tympanic membrane, ear canal and external ear normal.      Left Ear: Tympanic membrane, ear canal and external ear normal.      Nose: Congestion present.      Mouth/Throat:      Mouth: " Mucous membranes are moist.      Pharynx: Posterior oropharyngeal erythema present.   Eyes:      General: No scleral icterus.     Conjunctiva/sclera: Conjunctivae normal.   Cardiovascular:      Rate and Rhythm: Normal rate and regular rhythm.      Pulses: Normal pulses.      Heart sounds: Normal heart sounds.   Pulmonary:      Effort: Pulmonary effort is normal. No respiratory distress.      Breath sounds: Normal breath sounds.   Abdominal:      General: Bowel sounds are normal.      Palpations: Abdomen is soft.      Tenderness: There is no abdominal tenderness. There is no right CVA tenderness or left CVA tenderness.   Musculoskeletal:         General: Normal range of motion.      Cervical back: Normal range of motion and neck supple.   Lymphadenopathy:      Cervical: No cervical adenopathy.   Skin:     General: Skin is warm and dry.   Neurological:      Mental Status: He is alert and oriented to person, place, and time.   Psychiatric:         Mood and Affect: Mood normal.         Behavior: Behavior normal.            Medical Decision Making:      Comorbidities that affect care:    Obesity    External Notes reviewed:    Previous Clinic Note: Patient seen at urgent care back in November for lateral malleolus fibula fracture      The following orders were placed and all results were independently analyzed by me:  Orders Placed This Encounter   Procedures    COVID-19, FLU A/B, RSV PCR 1 HR TAT - Swab, Nasopharynx    Rapid Strep A Screen - Swab, Throat    Beta Strep Culture, Throat - Swab, Throat    XR Chest 1 View    Check temperature       Medications Given in the Emergency Department:  Medications   ibuprofen (ADVIL,MOTRIN) tablet 800 mg (800 mg Oral Not Given 2/23/24 0225)   acetaminophen (TYLENOL) tablet 1,000 mg (1,000 mg Oral Given 2/23/24 0120)   ondansetron ODT (ZOFRAN-ODT) disintegrating tablet 4 mg (4 mg Oral Given 2/23/24 0120)        ED Course:    ED Course as of 02/23/24 0307   Fri Feb 23, 2024   0249 XR  Chest 1 View  Scarring and atelectasis.  No infiltrate [DS]      ED Course User Index  [DS] Mars Marquezn, JIN       Labs:    Lab Results (last 24 hours)       Procedure Component Value Units Date/Time    COVID-19, FLU A/B, RSV PCR 1 HR TAT - Swab, Nasopharynx [[id number]]  (Abnormal) Collected: 02/23/24 0114    Specimen: Swab from Nasopharynx Updated: 02/23/24 0259     COVID19 Detected     Influenza A PCR Not Detected     Influenza B PCR Not Detected     RSV, PCR Not Detected    Narrative:      Fact sheet for providers: https://www.fda.gov/media/378391/download    Fact sheet for patients: https://www.fda.gov/media/168692/download    Test performed by PCR.    Rapid Strep A Screen - Swab, Throat [[id number]]  (Normal) Collected: 02/23/24 0220    Specimen: Swab from Throat Updated: 02/23/24 0241     Strep A Ag Negative    Beta Strep Culture, Throat - Swab, Throat [[id number]] Collected: 02/23/24 0220    Specimen: Swab from Throat Updated: 02/23/24 0241             Imaging:    XR Chest 1 View    Result Date: 2/23/2024  PROCEDURE: XR CHEST 1 VW  COMPARISON: [facility], CR, XR CHEST 1 VW, 6/07/2022, 5:38.  INDICATIONS: fever/cough  FINDINGS:  Lung volumes are low.  The heart appears mildly enlarged.  Pulmonary vascularity is normal.  Mild chronic changes in the lung bases may reflect some atelectasis or chronic scarring.  No pneumothorax.       Mild cardiac enlargement with low lung volumes and chronic scarring/atelectasis in the bases.       BONY PLUMMER MD       Electronically Signed and Approved By: BONY PLUMMER MD on 2/23/2024 at 2:14                Differential Diagnosis and Discussion:    Cough: Differential diagnosis includes but is not limited to pneumonia, acute bronchitis, upper respiratory infection, ACE inhibitor use, allergic reaction, epiglottitis, seasonal allergies, chemical irritants, exercise-induced asthma, viral syndrome.  Fever: Based on the complaint of fever, differential diagnosis  includes but is not limited to meningitis, pneumonia, pyelonephritis, acute uti,  systemic immune response syndrome, sepsis, viral syndrome, fungal infection, tick born illness and other bacterial infections.    All labs were reviewed and interpreted by me.  All X-rays impressions were independently interpreted by me.    MDM  Number of Diagnoses or Management Options  COVID-19 virus detected  Diagnosis management comments: The patient presents to the ED with a cough. The patient is resting comfortably and feels better, is alert and in no distress.  On re-examination the patient does not appear toxic and has no meningeal signs (including a negative Kernig and Brudzinski sign), and there's no intractable vomiting, respiratory distress and no apparent pain. Based on the history, exam, diagnostic testing and reassessment, the patient has no signs of meningitis, significant pneumonia, pyelonephritis, sepsis or other acute serious bacterial infections, or other significant pathology to warrant further testing, continued ED treatment, admission or specialist evaluation. The patient's vital signs have been stable. The patient's condition is stable and is appropriate for discharge. The patient´s symptoms are consistent with a viral upper respiratory infection and antibiotics are not indicated. The patient was counseled to return to the ED for re-evaluation for worsening cough, shortness of breath, uncontrollable headache, uncontrollable fever, altered mental status, or any symptoms which cause them concern. The patient will pursue further outpatient evaluation with the primary care physician or other designated or consultant physician as indicated in the discharge instructions.               Amount and/or Complexity of Data Reviewed  Clinical lab tests: reviewed and ordered  Tests in the radiology section of CPT®: reviewed and ordered  Tests in the medicine section of CPT®: ordered and reviewed    Risk of Complications,  Morbidity, and/or Mortality  Presenting problems: moderate  Diagnostic procedures: low  Management options: low    Patient Progress  Patient progress: stable           Patient Care Considerations:    SEPSIS was considered but is NOT present in the emergency department as SIRS criteria is not present. ANTIBIOTICS: I considered prescribing antibiotics as an outpatient however no bacterial focus of infection was found.      Consultants/Shared Management Plan:    None    Social Determinants of Health:    Patient is independent, reliable, and has access to care.       Disposition and Care Coordination:    Discharged: The patient is suitable and stable for discharge with no need for consideration of admission.    I have explained the patient´s condition, diagnoses and treatment plan based on the information available to me at this time. I have answered questions and addressed any concerns. The patient has a good  understanding of the patient´s diagnosis, condition, and treatment plan as can be expected at this point. The vital signs have been stable. The patient´s condition is stable and appropriate for discharge from the emergency department.      The patient will pursue further outpatient evaluation with the primary care physician or other designated or consulting physician as outlined in the discharge instructions. They are agreeable to this plan of care and follow-up instructions have been explained in detail. The patient has received these instructions in written format and has expressed an understanding of the discharge instructions. The patient is aware that any significant change in condition or worsening of symptoms should prompt an immediate return to this or the closest emergency department or call to 911.  I have explained discharge medications and the need for follow up with the patient/caretakers. This was also printed in the discharge instructions. Patient was discharged with the following medications and  follow up:      Medication List        New Prescriptions      albuterol sulfate  (90 Base) MCG/ACT inhaler  Commonly known as: PROVENTIL HFA;VENTOLIN HFA;PROAIR HFA  Inhale 2 puffs Every 6 (Six) Hours As Needed for Shortness of Air.     dexAMETHasone 4 MG tablet  Commonly known as: DECADRON  Take 1 tablet by mouth 2 (Two) Times a Day With Meals for 7 days.     ondansetron ODT 4 MG disintegrating tablet  Commonly known as: ZOFRAN-ODT  Place 1 tablet on the tongue 4 (Four) Times a Day As Needed for Nausea or Vomiting.            Stop      azithromycin 250 MG tablet  Commonly known as: Zithromax Z-Abdoulaye               Where to Get Your Medications        These medications were sent to [facility]/pharmacy #[id number] - [place], [place] - [address] - [phone]  - [phone]   [address] [place] [address]      Hours: 24-hours Phone: [phone]   albuterol sulfate  (90 Base) MCG/ACT inhaler  benzonatate 200 MG capsule  dexAMETHasone 4 MG tablet  ondansetron ODT 4 MG disintegrating tablet  promethazine-dextromethorphan 6.25-15 MG/5ML syrup      Denisha Adam MD  [address] [facility] [address] [phone]      As needed       Final diagnoses:   COVID-19 virus detected        ED Disposition       ED Disposition   Discharge    Condition   Stable    Comment   --               This medical record created using voice recognition software.             Yumiko Marquez, APRN  02/23/24 7825

## 2024-02-23 NOTE — DISCHARGE INSTRUCTIONS
You have tested positive for COVID-19.  This is viral and has to run its course.  The only treatment is symptomatic management.    Take medications as prescribed.    Rest.  Drink plenty of fluids.  Tylenol for fever or pain every 4 hours.    Follow-up with your PCP as needed.    Return for new or worsening symptoms

## 2024-02-23 NOTE — Clinical Note
[facility] EMERGENCY ROOM  [address] [place] [address]  Phone: [phone]  Fax: [phone]    Len Giron was seen and treated in our emergency department on 2/23/2024.  He may return to work on 02/28/2024.         Thank you for choosing [facility].    Yumiko Marquez APRN

## 2024-02-23 NOTE — Clinical Note
[facility] EMERGENCY ROOM  [address] [place] [address]  Phone: [phone]  Fax: [phone]    Len Giron was seen and treated in our emergency department on 2/23/2024.  He may return to work on 02/28/2024.         Thank you for choosing [facility].    Yumiko Maqruez APRN

## 2024-02-25 LAB — BACTERIA SPEC AEROBE CULT: NORMAL

## 2024-11-25 ENCOUNTER — HOSPITAL ENCOUNTER (OUTPATIENT)
Dept: OTHER | Facility: HOSPITAL | Age: 54
Discharge: HOME | End: 2024-11-25

## 2024-12-11 ENCOUNTER — HOSPITAL ENCOUNTER (OUTPATIENT)
Dept: OTHER | Facility: HOSPITAL | Age: 54
Discharge: HOME | End: 2024-12-11

## 2025-01-19 ENCOUNTER — HOSPITAL ENCOUNTER (OUTPATIENT)
Dept: OTHER | Facility: HOSPITAL | Age: 55
Discharge: HOME | End: 2025-01-19

## 2025-04-16 ENCOUNTER — HOSPITAL ENCOUNTER (OUTPATIENT)
Dept: OTHER | Facility: HOSPITAL | Age: 55
Discharge: HOME | End: 2025-04-16

## 2025-06-18 ENCOUNTER — OFFICE VISIT (OUTPATIENT)
Dept: NEUROSURGERY | Facility: CLINIC | Age: 55
End: 2025-06-18
Payer: OTHER GOVERNMENT

## 2025-06-18 VITALS
HEART RATE: 72 BPM | HEIGHT: 73 IN | SYSTOLIC BLOOD PRESSURE: 128 MMHG | BODY MASS INDEX: 38.04 KG/M2 | WEIGHT: 287 LBS | DIASTOLIC BLOOD PRESSURE: 86 MMHG

## 2025-06-18 DIAGNOSIS — M47.812 CERVICAL SPONDYLOSIS WITHOUT MYELOPATHY: Primary | ICD-10-CM

## 2025-06-18 DIAGNOSIS — M48.02 SPINAL STENOSIS IN CERVICAL REGION: ICD-10-CM

## 2025-06-18 RX ORDER — LOSARTAN POTASSIUM 25 MG/1
25 TABLET ORAL DAILY
COMMUNITY

## 2025-06-18 RX ORDER — FLUTICASONE PROPIONATE 50 MCG
SPRAY, SUSPENSION (ML) NASAL
COMMUNITY
Start: 2025-04-30

## 2025-06-18 RX ORDER — DULOXETIN HYDROCHLORIDE 20 MG/1
20 CAPSULE, DELAYED RELEASE ORAL
COMMUNITY
Start: 2025-02-13

## 2025-06-18 RX ORDER — MONTELUKAST SODIUM 10 MG/1
10 TABLET ORAL
COMMUNITY
Start: 2025-04-30

## 2025-06-18 RX ORDER — EPINEPHRINE 0.3 MG/.3ML
INJECTION SUBCUTANEOUS
COMMUNITY
Start: 2025-04-30

## 2025-06-18 RX ORDER — FEXOFENADINE HCL 180 MG/1
180 TABLET ORAL DAILY
COMMUNITY
Start: 2025-04-08

## 2025-06-18 RX ORDER — HYDROCODONE BITARTRATE AND ACETAMINOPHEN 5; 325 MG/1; MG/1
1 TABLET ORAL DAILY PRN
COMMUNITY
Start: 2025-04-24

## 2025-06-18 NOTE — PROGRESS NOTES
"Chief Complaint  Neck Pain, Arm Pain (Right arm at times ), Shoulder Pain (Right ), Tingling (Finger tips on bilateral hands at times ), and Weakness - Generalized (Trouble gripping with left hand )    Subjective          Len Giron who is a 55 y.o. year old male who presents to [facility] for Evaluation of the Spine.     The patient complains of pain located in the Cervical Spine.  Patients states the pain has been present for many years. The pain came on gradually.  The pain scaled level is 4.  The pain does radiate. Dermatomes are located on right Cervical at: a non-specific dermatome. The right arm pain only occurred on one occasion and resolved with medication treatments in the ER. The pain is constant and waxing/waning and described as aching.  The pain is worse at nighttime. Patient states home exercises, going to the pool, pain cream makes the pain better.  Patient states activity in general worsens pain.    Associated Symptoms Include: Tingling at times in the finger tips of both hands, non-specific and varying, not severe. Weakness in the left hand .  Conservative Interventions Include: Pain Medications that were ineffective.    Was this the result of an injury or accident? : No    History of Previous Spinal Surgery?: No     reports that he has quit smoking. He quit smokeless tobacco use about 8 years ago.  His smokeless tobacco use included chew.    Review of Systems   Musculoskeletal:  Positive for neck pain and neck stiffness.   Neurological:  Positive for weakness (gripping with left hand).        Objective   Vital Signs:   /86 (BP Location: Left arm, Patient Position: Sitting, Cuff Size: Adult)   Pulse 72   Ht 185.4 cm (73\")   Wt 130 kg (287 lb)   BMI 37.87 kg/m²       Physical Exam  Constitutional:       Appearance: Normal appearance. He is obese.   Neck:      Comments: Pain with extension of the neck  Pulmonary:      Effort: Pulmonary " effort is normal.   Musculoskeletal:         General: Tenderness (cervical area) present.      Comments: Valle's negative,  Tinel's test positive at the right wrist   Neurological:      General: No focal deficit present.      Mental Status: He is alert and oriented to person, place, and time.      Sensory: No sensory deficit.      Motor: Weakness (left > right hand ) present.      Deep Tendon Reflexes: Reflexes normal.   Psychiatric:         Mood and Affect: Mood normal.         Behavior: Behavior normal.        Neurological Exam  Mental Status  Alert. Oriented to person, place, and time.      Result Review     I have reviewed radiology report for MRI of the cervical spine without contrast from 1/19/2025 which shows multilevel spondylosis.  There is mention severe right foraminal narrowing at C3-C4, severe bilateral foraminal narrowing at C6-C7.  These images are not available for review today.    I have independently interpreted the CT of cervical spine from 1/19/2025 which shows severe degenerative disc disease at C6-C7 where there is obvious severe central canal stenosis at this level.     Assessment and Plan    Diagnoses and all orders for this visit:    1. Cervical spondylosis without myelopathy (Primary)  -     MRI Cervical Spine Without Contrast; Future    2. Spinal stenosis in cervical region  -     MRI Cervical Spine Without Contrast; Future    We have so far been unable to obtain images pertaining to MRI of the cervical spine without contrast, though [facility] - they continue to send the CT of cervical spine images. Either the radiology report is incorrectly documented as an MRI, or the patient had both and they erroneously are sending the wrong pictures. Either way, the CT images are not helpful for surgical planning.    I do expect that with stenosis at C6-C7 and right arm pain, he may be a surgical candidate. I would consider a new MRI of the cervical spine  ordered outside of the [facility] network to evaluate further for possible surgery. He is agreeable.    Surgery would be most likely to help arm pain in a C7 pattern (to the middle 3 fingers), but would not typically help neck pain.    His right arm pain is not persistent, which I expect would reduce his success rate with cervical spine surgery.    He reports tingling in both hands and weakness in the left hand , which could also be related to cervical nerve involvement, but I would question the presence of peripheral neuropathy. I would consider NCV/EMG testing of the arms to assess for median or ulnar neuropathy which may contribute to his overall complaints. He is deferring this today.    He could consider PT vs pain management for conservative treatment. He is already doing PT and sees pain management.    He will follow-up after new MRI.    I spent 54 minutes caring for Len on this date of service. This time includes time spent by me in the following activities:preparing for the visit, reviewing tests, performing a medically appropriate examination and/or evaluation , counseling and educating the patient/family/caregiver, ordering medications, tests, or procedures, documenting information in the medical record, and independently interpreting results and communicating that information with the patient/family/caregiver    Follow Up   Return in about 4 weeks (around 7/16/2025).  Patient was given instructions and counseling regarding his condition or for health maintenance advice. Please see specific information pulled into the AVS if appropriate.

## 2025-07-01 ENCOUNTER — TELEPHONE (OUTPATIENT)
Dept: NEUROSURGERY | Facility: CLINIC | Age: 55
End: 2025-07-01
Payer: OTHER GOVERNMENT

## 2025-07-01 NOTE — TELEPHONE ENCOUNTER
PT CALLED STATES THAT [facility] NEED[facility] TO [facility] ORDER SENT TO AN [facility]AGING [facility]AC[facility]IDE [facility] AS [facility] WILL APPROVE IT. PT DOESN'T KNOW WHERE IS SHOULD BE SENT TO ONLY IT NEEDS TO BE SOMEWHERE [facility] THAN [facility]. PLEASE ADVISE

## 2025-07-01 NOTE — TELEPHONE ENCOUNTER
PT CALLED BACK: HE WOULD LIKE TO HAVE IT COMPLETED AT [facility] IN [facility]. PLEASE ADVISE! THANKS!      PT CONTACT: [phone]  BEST TIME TO CALL: ANYTIME

## 2025-07-08 NOTE — TELEPHONE ENCOUNTER
Called pt to get update on MRI. Pt adv he has not received a call to schedule it. Adv pt would refax this order and if he hasn't heard anything by Friday to let me know. Pt verbally understood.

## 2025-07-09 NOTE — TELEPHONE ENCOUNTER
HOUSTON FROM [facility] CALLED: STATES D/T GOV INSURANCE THE OFFICE WILL NEED TO OBTAIN THE AUTH AND SENT IT TO THEM. STATES SHE WILL NEED THE AUTH BY TOMORROW BY 2PM OR PT WILL HAVE TO RESCHEDULED FROM HIS 7/11/25 APPT. PLEASE ADVISE! THANKS!      HOUSTON'S CONTACT: [phone]

## 2025-07-11 ENCOUNTER — HOSPITAL ENCOUNTER (OUTPATIENT)
Dept: OTHER | Facility: HOSPITAL | Age: 55
Discharge: HOME | End: 2025-07-11
Payer: OTHER GOVERNMENT

## 2025-07-14 DIAGNOSIS — M48.02 SPINAL STENOSIS IN CERVICAL REGION: ICD-10-CM

## 2025-07-14 DIAGNOSIS — M47.812 CERVICAL SPONDYLOSIS WITHOUT MYELOPATHY: ICD-10-CM

## 2025-07-22 ENCOUNTER — OFFICE VISIT (OUTPATIENT)
Dept: NEUROSURGERY | Facility: CLINIC | Age: 55
End: 2025-07-22
Payer: OTHER GOVERNMENT

## 2025-07-22 VITALS
HEIGHT: 73 IN | BODY MASS INDEX: 37.51 KG/M2 | WEIGHT: 283 LBS | DIASTOLIC BLOOD PRESSURE: 90 MMHG | HEART RATE: 68 BPM | SYSTOLIC BLOOD PRESSURE: 125 MMHG

## 2025-07-22 DIAGNOSIS — M48.02 SPINAL STENOSIS IN CERVICAL REGION: Primary | ICD-10-CM

## 2025-07-22 DIAGNOSIS — M47.812 CERVICAL SPONDYLOSIS WITHOUT MYELOPATHY: ICD-10-CM

## 2025-07-22 NOTE — PROGRESS NOTES
Patient being seen for today for Follow-up (MRI Cervical Spine Without Contrast completed on 7/11/2025)  .    Subjective    Len Giron is a 55 y.o. male that presents with Follow-up (MRI Cervical Spine Without Contrast completed on 7/11/2025)  .    HPI  Previously: Last seen on 6/18/2025 for complaints of neck pain extending down the right arm on occasion, tingling at times in the fingertips of both hands.  He had a previous MRI of the cervical spine which we had so far been unable to obtain the images for after multiple [facility].  There were CT images showing severe degenerative disc disease at C6-C7 with central canal narrowing.  Per the radiology report for MRI of the cervical spine without contrast from 1/19/2025 there was multilevel spondylosis with severe right foraminal narrowing at C3-C4, bilateral at C6-C7.  There was an order for a new MRI of the cervical spine to evaluate further and for possible surgical planning.  We discussed NCV/EMG testing to assess for median or ulnar neuropathy which she deferred.  We discussed physical therapy versus pain management as conservative treatment, which he is already doing.  There was plan to follow-up to reassess after MRI.    Today: He reports some improved pain in the neck with neck exercises. He rates his neck pain 4-5/10 today. He denies any recurrent episodes of right arm pain.    He thinks that Duloxetine has been helpful, as he has been taking this daily.     reports that he has quit smoking. He quit smokeless tobacco use about 9 years ago.  His smokeless tobacco use included chew.    Review of Systems   Musculoskeletal:  Positive for neck pain.       Objective   Vitals:    07/22/25 1505   BP: 125/90   Pulse: 68        Physical Exam  Constitutional:       Appearance: Normal appearance. He is obese.   Neck:      Comments: Pain with ROM (flexion)  Pulmonary:      Effort: Pulmonary effort is normal.   Musculoskeletal:         General: Tenderness  present.      Comments: Positive tinel's at right wrist   Neurological:      General: No focal deficit present.      Mental Status: He is alert and oriented to person, place, and time.      Sensory: No sensory deficit.      Motor: No weakness.      Deep Tendon Reflexes: Reflexes normal.   Psychiatric:         Mood and Affect: Mood normal.         Behavior: Behavior normal.          Result Review   I have independently interpreted the MRI of cervical spine without contrast from 7/11/2025 which shows severe multilevel spondylosis.  There is partial bony fusion at C5-C6.  There is severe canal and bilateral foraminal narrowing at C6-C7.  There is mild left foraminal narrowing at C4-C5.  There is severe right foraminal narrowing at C3-C4.     Assessment and Plan {CC Problem List  Visit Diagnosis  ROS  Review (Popup)  Myntra  Quality  BestPractice  Medications  SmartSets  SnapShot Encounters  Media :23}   Diagnoses and all orders for this visit:    1. Spinal stenosis in cervical region (Primary)    2. Cervical spondylosis without myelopathy    He has continued neck pain. He has right arm pain on occasion along with tingling at times in the fingertips of both hands.    There is severe stenosis at C6-C7 which includes bilateral foraminal stenosis. There is severe right foraminal stenosis at C3-C4.    Surgery typically would not help neck pain.    He may benefit for arm pain, but considering that his arm pain is occasional we certainly need to consider that he may get limited benefit from surgery.    He is currently doing epidural treatments in the lower back. I do recommend that he consider cervical epidural steroid injections for the neck. He may also be a candidate for MBBs/RFA in the cervical spine targeting neck pain.    The patient was counseled on basic recommendations for the reduction and prevention of back, neck, or spine pain in association with spinal disorders, including:  cessation/avoidance of nicotine use, maintenance of a healthy BMI and weight, focusing on building/maintaining core strength through core exercise, and avoidance of activities which worsen the pain. \The patient will monitor for changes in symptoms and notify our clinic of these changes as needed.  Follow Up {Instructions Charge Capture  Follow-up Communications :23}   Return if symptoms worsen or fail to improve.

## (undated) DEVICE — COLON KIT: Brand: MEDLINE INDUSTRIES, INC.

## (undated) DEVICE — Device: Brand: DEFENDO AIR/WATER/SUCTION AND BIOPSY VALVE

## (undated) DEVICE — SOL IRRG H2O PL/BG 1000ML STRL